# Patient Record
Sex: FEMALE | Race: OTHER | HISPANIC OR LATINO | URBAN - METROPOLITAN AREA
[De-identification: names, ages, dates, MRNs, and addresses within clinical notes are randomized per-mention and may not be internally consistent; named-entity substitution may affect disease eponyms.]

---

## 2017-06-24 ENCOUNTER — INPATIENT (INPATIENT)
Facility: HOSPITAL | Age: 62
LOS: 2 days | Discharge: HOME | DRG: 313 | End: 2017-06-27
Attending: INTERNAL MEDICINE | Admitting: INTERNAL MEDICINE
Payer: MEDICARE

## 2017-06-24 VITALS
SYSTOLIC BLOOD PRESSURE: 172 MMHG | RESPIRATION RATE: 18 BRPM | DIASTOLIC BLOOD PRESSURE: 84 MMHG | TEMPERATURE: 98 F | HEIGHT: 58 IN | HEART RATE: 72 BPM | OXYGEN SATURATION: 98 % | WEIGHT: 149.91 LBS

## 2017-06-24 DIAGNOSIS — E11.9 TYPE 2 DIABETES MELLITUS WITHOUT COMPLICATIONS: ICD-10-CM

## 2017-06-24 DIAGNOSIS — I24.9 ACUTE ISCHEMIC HEART DISEASE, UNSPECIFIED: ICD-10-CM

## 2017-06-24 DIAGNOSIS — I10 ESSENTIAL (PRIMARY) HYPERTENSION: ICD-10-CM

## 2017-06-24 DIAGNOSIS — Z29.9 ENCOUNTER FOR PROPHYLACTIC MEASURES, UNSPECIFIED: ICD-10-CM

## 2017-06-24 DIAGNOSIS — N76.0 ACUTE VAGINITIS: ICD-10-CM

## 2017-06-24 DIAGNOSIS — E86.0 DEHYDRATION: ICD-10-CM

## 2017-06-24 LAB
ACETONE SERPL-MCNC: ABNORMAL
ALBUMIN SERPL ELPH-MCNC: 3.5 G/DL — SIGNIFICANT CHANGE UP (ref 3.5–5)
ALP SERPL-CCNC: 80 U/L — SIGNIFICANT CHANGE UP (ref 40–120)
ALT FLD-CCNC: 44 U/L DA — SIGNIFICANT CHANGE UP (ref 10–60)
ANION GAP SERPL CALC-SCNC: 7 MMOL/L — SIGNIFICANT CHANGE UP (ref 5–17)
APPEARANCE UR: CLEAR — SIGNIFICANT CHANGE UP
AST SERPL-CCNC: 38 U/L — SIGNIFICANT CHANGE UP (ref 10–40)
BASOPHILS # BLD AUTO: 0.1 K/UL — SIGNIFICANT CHANGE UP (ref 0–0.2)
BASOPHILS NFR BLD AUTO: 1.1 % — SIGNIFICANT CHANGE UP (ref 0–2)
BILIRUB SERPL-MCNC: 0.5 MG/DL — SIGNIFICANT CHANGE UP (ref 0.2–1.2)
BILIRUB UR-MCNC: NEGATIVE — SIGNIFICANT CHANGE UP
BUN SERPL-MCNC: 12 MG/DL — SIGNIFICANT CHANGE UP (ref 7–18)
CALCIUM SERPL-MCNC: 9.1 MG/DL — SIGNIFICANT CHANGE UP (ref 8.4–10.5)
CHLORIDE SERPL-SCNC: 105 MMOL/L — SIGNIFICANT CHANGE UP (ref 96–108)
CK MB BLD-MCNC: <1.3 % — SIGNIFICANT CHANGE UP (ref 0–3.5)
CK MB BLD-MCNC: <2.2 % — SIGNIFICANT CHANGE UP (ref 0–3.5)
CK MB CFR SERPL CALC: <1 NG/ML — SIGNIFICANT CHANGE UP (ref 0–3.6)
CK MB CFR SERPL CALC: <1 NG/ML — SIGNIFICANT CHANGE UP (ref 0–3.6)
CK SERPL-CCNC: 45 U/L — SIGNIFICANT CHANGE UP (ref 21–215)
CK SERPL-CCNC: 75 U/L — SIGNIFICANT CHANGE UP (ref 21–215)
CO2 SERPL-SCNC: 25 MMOL/L — SIGNIFICANT CHANGE UP (ref 22–31)
COLOR SPEC: YELLOW — SIGNIFICANT CHANGE UP
CREAT SERPL-MCNC: 0.73 MG/DL — SIGNIFICANT CHANGE UP (ref 0.5–1.3)
DIFF PNL FLD: NEGATIVE — SIGNIFICANT CHANGE UP
EOSINOPHIL # BLD AUTO: 0 K/UL — SIGNIFICANT CHANGE UP (ref 0–0.5)
EOSINOPHIL NFR BLD AUTO: 0.2 % — SIGNIFICANT CHANGE UP (ref 0–6)
GLUCOSE SERPL-MCNC: 129 MG/DL — HIGH (ref 70–99)
GLUCOSE UR QL: NEGATIVE — SIGNIFICANT CHANGE UP
HCT VFR BLD CALC: 44.8 % — SIGNIFICANT CHANGE UP (ref 34.5–45)
HGB BLD-MCNC: 14.9 G/DL — SIGNIFICANT CHANGE UP (ref 11.5–15.5)
KETONES UR-MCNC: ABNORMAL
LEUKOCYTE ESTERASE UR-ACNC: NEGATIVE — SIGNIFICANT CHANGE UP
LYMPHOCYTES # BLD AUTO: 1.2 K/UL — SIGNIFICANT CHANGE UP (ref 1–3.3)
LYMPHOCYTES # BLD AUTO: 10.8 % — LOW (ref 13–44)
MAGNESIUM SERPL-MCNC: 1.8 MG/DL — SIGNIFICANT CHANGE UP (ref 1.6–2.6)
MCHC RBC-ENTMCNC: 31.2 PG — SIGNIFICANT CHANGE UP (ref 27–34)
MCHC RBC-ENTMCNC: 33.1 GM/DL — SIGNIFICANT CHANGE UP (ref 32–36)
MCV RBC AUTO: 94.1 FL — SIGNIFICANT CHANGE UP (ref 80–100)
MONOCYTES # BLD AUTO: 0.4 K/UL — SIGNIFICANT CHANGE UP (ref 0–0.9)
MONOCYTES NFR BLD AUTO: 3.9 % — SIGNIFICANT CHANGE UP (ref 2–14)
NEUTROPHILS # BLD AUTO: 9.1 K/UL — HIGH (ref 1.8–7.4)
NEUTROPHILS NFR BLD AUTO: 84 % — HIGH (ref 43–77)
NITRITE UR-MCNC: NEGATIVE — SIGNIFICANT CHANGE UP
NT-PROBNP SERPL-SCNC: 78 PG/ML — SIGNIFICANT CHANGE UP (ref 0–125)
PH UR: 7 — SIGNIFICANT CHANGE UP (ref 5–8)
PLATELET # BLD AUTO: 261 K/UL — SIGNIFICANT CHANGE UP (ref 150–400)
POTASSIUM SERPL-MCNC: 4.3 MMOL/L — SIGNIFICANT CHANGE UP (ref 3.5–5.3)
POTASSIUM SERPL-SCNC: 4.3 MMOL/L — SIGNIFICANT CHANGE UP (ref 3.5–5.3)
PROT SERPL-MCNC: 8 G/DL — SIGNIFICANT CHANGE UP (ref 6–8.3)
PROT UR-MCNC: NEGATIVE — SIGNIFICANT CHANGE UP
RBC # BLD: 4.77 M/UL — SIGNIFICANT CHANGE UP (ref 3.8–5.2)
RBC # FLD: 12.3 % — SIGNIFICANT CHANGE UP (ref 10.3–14.5)
SODIUM SERPL-SCNC: 137 MMOL/L — SIGNIFICANT CHANGE UP (ref 135–145)
SP GR SPEC: 1 — LOW (ref 1.01–1.02)
TROPONIN I SERPL-MCNC: <0.015 NG/ML — SIGNIFICANT CHANGE UP (ref 0–0.04)
TROPONIN I SERPL-MCNC: <0.015 NG/ML — SIGNIFICANT CHANGE UP (ref 0–0.04)
UROBILINOGEN FLD QL: NEGATIVE — SIGNIFICANT CHANGE UP
WBC # BLD: 10.8 K/UL — HIGH (ref 3.8–10.5)
WBC # FLD AUTO: 10.8 K/UL — HIGH (ref 3.8–10.5)

## 2017-06-24 PROCEDURE — 70450 CT HEAD/BRAIN W/O DYE: CPT | Mod: 26

## 2017-06-24 PROCEDURE — 99285 EMERGENCY DEPT VISIT HI MDM: CPT

## 2017-06-24 PROCEDURE — 71010: CPT | Mod: 26

## 2017-06-24 RX ORDER — SODIUM CHLORIDE 9 MG/ML
1000 INJECTION INTRAMUSCULAR; INTRAVENOUS; SUBCUTANEOUS
Qty: 0 | Refills: 0 | Status: DISCONTINUED | OUTPATIENT
Start: 2017-06-24 | End: 2017-06-26

## 2017-06-24 RX ORDER — METOPROLOL TARTRATE 50 MG
50 TABLET ORAL
Qty: 0 | Refills: 0 | Status: DISCONTINUED | OUTPATIENT
Start: 2017-06-24 | End: 2017-06-24

## 2017-06-24 RX ORDER — GLUCAGON INJECTION, SOLUTION 0.5 MG/.1ML
1 INJECTION, SOLUTION SUBCUTANEOUS ONCE
Qty: 0 | Refills: 0 | Status: DISCONTINUED | OUTPATIENT
Start: 2017-06-24 | End: 2017-06-27

## 2017-06-24 RX ORDER — FLUCONAZOLE 150 MG/1
100 TABLET ORAL DAILY
Qty: 0 | Refills: 0 | Status: DISCONTINUED | OUTPATIENT
Start: 2017-06-24 | End: 2017-06-27

## 2017-06-24 RX ORDER — INSULIN LISPRO 100/ML
VIAL (ML) SUBCUTANEOUS
Qty: 0 | Refills: 0 | Status: DISCONTINUED | OUTPATIENT
Start: 2017-06-24 | End: 2017-06-27

## 2017-06-24 RX ORDER — MECLIZINE HCL 12.5 MG
12.5 TABLET ORAL ONCE
Qty: 0 | Refills: 0 | Status: COMPLETED | OUTPATIENT
Start: 2017-06-24 | End: 2017-06-24

## 2017-06-24 RX ORDER — METOPROLOL TARTRATE 50 MG
50 TABLET ORAL
Qty: 0 | Refills: 0 | Status: DISCONTINUED | OUTPATIENT
Start: 2017-06-24 | End: 2017-06-26

## 2017-06-24 RX ORDER — ERGOCALCIFEROL 1.25 MG/1
50000 CAPSULE ORAL
Qty: 0 | Refills: 0 | Status: DISCONTINUED | OUTPATIENT
Start: 2017-06-25 | End: 2017-06-27

## 2017-06-24 RX ORDER — DEXTROSE 50 % IN WATER 50 %
25 SYRINGE (ML) INTRAVENOUS ONCE
Qty: 0 | Refills: 0 | Status: DISCONTINUED | OUTPATIENT
Start: 2017-06-24 | End: 2017-06-27

## 2017-06-24 RX ORDER — METOPROLOL TARTRATE 50 MG
12.5 TABLET ORAL
Qty: 0 | Refills: 0 | Status: DISCONTINUED | OUTPATIENT
Start: 2017-06-24 | End: 2017-06-24

## 2017-06-24 RX ORDER — ASPIRIN/CALCIUM CARB/MAGNESIUM 324 MG
162 TABLET ORAL ONCE
Qty: 0 | Refills: 0 | Status: COMPLETED | OUTPATIENT
Start: 2017-06-24 | End: 2017-06-24

## 2017-06-24 RX ORDER — DEXTROSE 50 % IN WATER 50 %
1 SYRINGE (ML) INTRAVENOUS ONCE
Qty: 0 | Refills: 0 | Status: DISCONTINUED | OUTPATIENT
Start: 2017-06-24 | End: 2017-06-27

## 2017-06-24 RX ORDER — PENICILLIN V POTASSIUM 250 MG
500 TABLET ORAL EVERY 6 HOURS
Qty: 0 | Refills: 0 | Status: DISCONTINUED | OUTPATIENT
Start: 2017-06-24 | End: 2017-06-27

## 2017-06-24 RX ORDER — ASPIRIN/CALCIUM CARB/MAGNESIUM 324 MG
81 TABLET ORAL DAILY
Qty: 0 | Refills: 0 | Status: DISCONTINUED | OUTPATIENT
Start: 2017-06-24 | End: 2017-06-27

## 2017-06-24 RX ORDER — ATORVASTATIN CALCIUM 80 MG/1
40 TABLET, FILM COATED ORAL AT BEDTIME
Qty: 0 | Refills: 0 | Status: DISCONTINUED | OUTPATIENT
Start: 2017-06-24 | End: 2017-06-27

## 2017-06-24 RX ORDER — METFORMIN HYDROCHLORIDE 850 MG/1
500 TABLET ORAL
Qty: 0 | Refills: 0 | Status: DISCONTINUED | OUTPATIENT
Start: 2017-06-24 | End: 2017-06-27

## 2017-06-24 RX ORDER — DEXTROSE 50 % IN WATER 50 %
12.5 SYRINGE (ML) INTRAVENOUS ONCE
Qty: 0 | Refills: 0 | Status: DISCONTINUED | OUTPATIENT
Start: 2017-06-24 | End: 2017-06-27

## 2017-06-24 RX ORDER — SODIUM CHLORIDE 9 MG/ML
1000 INJECTION, SOLUTION INTRAVENOUS
Qty: 0 | Refills: 0 | Status: DISCONTINUED | OUTPATIENT
Start: 2017-06-24 | End: 2017-06-27

## 2017-06-24 RX ADMIN — ATORVASTATIN CALCIUM 40 MILLIGRAM(S): 80 TABLET, FILM COATED ORAL at 20:06

## 2017-06-24 RX ADMIN — SODIUM CHLORIDE 75 MILLILITER(S): 9 INJECTION INTRAMUSCULAR; INTRAVENOUS; SUBCUTANEOUS at 21:05

## 2017-06-24 RX ADMIN — Medication 162 MILLIGRAM(S): at 19:13

## 2017-06-24 RX ADMIN — Medication 12.5 MILLIGRAM(S): at 15:43

## 2017-06-24 NOTE — ED PROVIDER NOTE - CHPI ED SYMPTOMS NEG
no chills/no diarrhea, no palpitations, no numbness, no tingling, no weakness, no visual changes/no fever

## 2017-06-24 NOTE — H&P ADULT - HISTORY OF PRESENT ILLNESS
61 y/o F pt with PMHx of HTN,  DM, HTN, presents to ED c/o intermittent substernal chest pain that radiates to right arm with 7/10 of intensity. Pt also has associated symptoms of dizziness, nausea, diaphoresis, fluctuating blood pressure, lightheadedness x 1 week.Pt also complaints of SOB, diaphoresis and urinary frequency. Pt denies fever, chills, diarrhea, palpitations, numbness, tingling, weakness, visual changes, or any other complaints. NKDA.

## 2017-06-24 NOTE — H&P ADULT - ASSESSMENT
61 y/o F pt with PMHx of HTN,  DM, HTN, presents to ED c/o intermittent substernal chest pain that radiates to right arm with 7/10 of intensity. Pt also has associated symptoms of dizziness, nausea, diaphoresis, fluctuating blood pressure, lightheadedness x 1 week

## 2017-06-24 NOTE — H&P ADULT - NSHPLABSRESULTS_GEN_ALL_CORE
Head CT:    In the head ventricular and cortical sulcal size is within normal limits.    No intracranial blood collections or subarachnoid hemorrhages are evident.    There is no sense of acute territorial infarct, mass, or edema.    IMPRESSION: Negative study.      CXR:    Clear lungs.

## 2017-06-24 NOTE — H&P ADULT - PROBLEM SELECTOR PLAN 1
63 y/o F pt with PMHx of HTN,  DM, HTN, presents to ED c/o intermittent substernal chest pain that radiates to right arm with 7/10 of intensity. associated symptoms of dizziness, nausea, diaphoresis, fluctuating blood pressure, lightheadedness. EKG normal sinus, with T wave inversions in lead 3 and 1-4  RADHA: 2 points  Due to positive risk factors of DM, HTN and presenting symptoms will admit for ACS protocol     Admit to tele  ASA, statin, BB  lipid profile, A1c  will get echo to obtain baseline  Cardio: 61 y/o F pt with PMHx of HTN,  DM, HTN, presents to ED c/o intermittent substernal chest pain that radiates to right arm with 7/10 of intensity. associated symptoms of dizziness, nausea, diaphoresis, fluctuating blood pressure, lightheadedness. EKG normal sinus, with T wave inversions in lead 3 and 1-4  RADHA: 2 points  Due to positive risk factors of DM, HTN and presenting symptoms will admit for ACS protocol     Admit to tele  ASA, statin, BB  lipid profile, A1c  will get echo to obtain baseline  Cardio: Dr. Wong

## 2017-06-24 NOTE — H&P ADULT - PROBLEM SELECTOR PLAN 5
VTE score=1  no need for DVT prophylaxis Pt is on actual  treatment for vaginitis  c/w Fluconazole    Pt is also taking penicillin due to a mouth surgery

## 2017-06-24 NOTE — ED PROVIDER NOTE - OBJECTIVE STATEMENT
63 y/o F pt with PMHx of DM (taking pills, did not take medication this morning), and HTN, presents to ED c/o fluctuating blood pressure, lightheadedness, dizziness x 1 week, intermittent mid sternum chest pain, lasting approximately 5 seconds, described as tightness that radiates to right arm x yesterday, vomiting and cough x today. Pt endorses SOB, diaphoresis and urinary frequency. Pt denies fever, chills, diarrhea, palpitations, numbness, tingling, weakness, visual changes, or any other complaints. NKDA.

## 2017-06-24 NOTE — ED ADULT NURSE NOTE - OBJECTIVE STATEMENT
presented axox3 ambulatory c/o chest discomfort and dizziness presented axox3 ambulatory c/o chest discomfort and dizziness denies SOB  seen by DR Barnhart EKG completed

## 2017-06-24 NOTE — ED PROVIDER NOTE - MEDICAL DECISION MAKING DETAILS
63 y/o F pt with dizziness, CP, with h/o DM, concern for ACS. Will get Orthostatic to r/o dehydration. Will admit pt.

## 2017-06-24 NOTE — H&P ADULT - PROBLEM SELECTOR PLAN 2
Pt presented with dizziness   Possible mild  dehydration as pt is having urinary frequency and has ketone in the blood. UA negative for infection, most likely due to DM, f/u A1c to assess compliance   will start IV hydration x12 hrs Pt presented with dizziness  Head CT negative for acute pathology   Possible mild  dehydration as pt is having urinary frequency and poor PO intake since pt  has ketone in the blood. UA negative for infection, Pt has history of incontinence   will start IV hydration x12 hrs

## 2017-06-24 NOTE — H&P ADULT - ATTENDING COMMENTS
late entry  patient seen and examined along with admitting tresident   above reviewed and agreed   care plan also discussed with er attending , patient and family

## 2017-06-24 NOTE — ED PROVIDER NOTE - CHPI ED SYMPTOMS POS
CHEST PAIN/fluctuating blood pressure, lightheadedness, vomiting, diaphoresis, urinary frequency, cough/SHORTNESS OF BREATH

## 2017-06-24 NOTE — H&P ADULT - NSHPPHYSICALEXAM_GEN_ALL_CORE
GEN: NAD  HEENT: no JVD, supple neck  CVS: RRR, no RMG  Lungs: CTAx2, no wheezing, no rhonchi, no rales  Abd: soft, non tender, + BS  Ext: no edema, no cyanosis, no rash

## 2017-06-25 LAB
ALBUMIN SERPL ELPH-MCNC: 3 G/DL — LOW (ref 3.5–5)
ALP SERPL-CCNC: 71 U/L — SIGNIFICANT CHANGE UP (ref 40–120)
ALT FLD-CCNC: 37 U/L DA — SIGNIFICANT CHANGE UP (ref 10–60)
ANION GAP SERPL CALC-SCNC: 7 MMOL/L — SIGNIFICANT CHANGE UP (ref 5–17)
AST SERPL-CCNC: 19 U/L — SIGNIFICANT CHANGE UP (ref 10–40)
BASOPHILS # BLD AUTO: 0.1 K/UL — SIGNIFICANT CHANGE UP (ref 0–0.2)
BASOPHILS NFR BLD AUTO: 0.9 % — SIGNIFICANT CHANGE UP (ref 0–2)
BILIRUB SERPL-MCNC: 0.4 MG/DL — SIGNIFICANT CHANGE UP (ref 0.2–1.2)
BUN SERPL-MCNC: 15 MG/DL — SIGNIFICANT CHANGE UP (ref 7–18)
CALCIUM SERPL-MCNC: 8.5 MG/DL — SIGNIFICANT CHANGE UP (ref 8.4–10.5)
CHLORIDE SERPL-SCNC: 109 MMOL/L — HIGH (ref 96–108)
CHOLEST SERPL-MCNC: 137 MG/DL — SIGNIFICANT CHANGE UP (ref 10–199)
CK SERPL-CCNC: 39 U/L — SIGNIFICANT CHANGE UP (ref 21–215)
CK SERPL-CCNC: 47 U/L — SIGNIFICANT CHANGE UP (ref 21–215)
CO2 SERPL-SCNC: 27 MMOL/L — SIGNIFICANT CHANGE UP (ref 22–31)
CREAT SERPL-MCNC: 0.68 MG/DL — SIGNIFICANT CHANGE UP (ref 0.5–1.3)
CULTURE RESULTS: SIGNIFICANT CHANGE UP
EOSINOPHIL # BLD AUTO: 0.1 K/UL — SIGNIFICANT CHANGE UP (ref 0–0.5)
EOSINOPHIL NFR BLD AUTO: 1.5 % — SIGNIFICANT CHANGE UP (ref 0–6)
GLUCOSE SERPL-MCNC: 113 MG/DL — HIGH (ref 70–99)
HBA1C BLD-MCNC: 6.1 % — HIGH (ref 4–5.6)
HCT VFR BLD CALC: 39.9 % — SIGNIFICANT CHANGE UP (ref 34.5–45)
HDLC SERPL-MCNC: 36 MG/DL — LOW (ref 40–125)
HGB BLD-MCNC: 13.7 G/DL — SIGNIFICANT CHANGE UP (ref 11.5–15.5)
LIPID PNL WITH DIRECT LDL SERPL: 75 MG/DL — SIGNIFICANT CHANGE UP
LYMPHOCYTES # BLD AUTO: 2.5 K/UL — SIGNIFICANT CHANGE UP (ref 1–3.3)
LYMPHOCYTES # BLD AUTO: 25.7 % — SIGNIFICANT CHANGE UP (ref 13–44)
MAGNESIUM SERPL-MCNC: 2 MG/DL — SIGNIFICANT CHANGE UP (ref 1.6–2.6)
MCHC RBC-ENTMCNC: 31.7 PG — SIGNIFICANT CHANGE UP (ref 27–34)
MCHC RBC-ENTMCNC: 34.3 GM/DL — SIGNIFICANT CHANGE UP (ref 32–36)
MCV RBC AUTO: 92.6 FL — SIGNIFICANT CHANGE UP (ref 80–100)
MONOCYTES # BLD AUTO: 0.8 K/UL — SIGNIFICANT CHANGE UP (ref 0–0.9)
MONOCYTES NFR BLD AUTO: 8.5 % — SIGNIFICANT CHANGE UP (ref 2–14)
NEUTROPHILS # BLD AUTO: 6.1 K/UL — SIGNIFICANT CHANGE UP (ref 1.8–7.4)
NEUTROPHILS NFR BLD AUTO: 63.4 % — SIGNIFICANT CHANGE UP (ref 43–77)
PHOSPHATE SERPL-MCNC: 3.5 MG/DL — SIGNIFICANT CHANGE UP (ref 2.5–4.5)
PLATELET # BLD AUTO: 265 K/UL — SIGNIFICANT CHANGE UP (ref 150–400)
POTASSIUM SERPL-MCNC: 3.6 MMOL/L — SIGNIFICANT CHANGE UP (ref 3.5–5.3)
POTASSIUM SERPL-SCNC: 3.6 MMOL/L — SIGNIFICANT CHANGE UP (ref 3.5–5.3)
PROT SERPL-MCNC: 7 G/DL — SIGNIFICANT CHANGE UP (ref 6–8.3)
RBC # BLD: 4.31 M/UL — SIGNIFICANT CHANGE UP (ref 3.8–5.2)
RBC # FLD: 12.5 % — SIGNIFICANT CHANGE UP (ref 10.3–14.5)
SODIUM SERPL-SCNC: 143 MMOL/L — SIGNIFICANT CHANGE UP (ref 135–145)
SPECIMEN SOURCE: SIGNIFICANT CHANGE UP
TOTAL CHOLESTEROL/HDL RATIO MEASUREMENT: 3.8 RATIO — SIGNIFICANT CHANGE UP (ref 3.3–7.1)
TRIGL SERPL-MCNC: 128 MG/DL — SIGNIFICANT CHANGE UP (ref 10–149)
TROPONIN I SERPL-MCNC: <0.015 NG/ML — SIGNIFICANT CHANGE UP (ref 0–0.04)
TROPONIN I SERPL-MCNC: <0.015 NG/ML — SIGNIFICANT CHANGE UP (ref 0–0.04)
TSH SERPL-MCNC: 0.97 UU/ML — SIGNIFICANT CHANGE UP (ref 0.34–4.82)
WBC # BLD: 9.6 K/UL — SIGNIFICANT CHANGE UP (ref 3.8–10.5)
WBC # FLD AUTO: 9.6 K/UL — SIGNIFICANT CHANGE UP (ref 3.8–10.5)

## 2017-06-25 RX ORDER — FLUTICASONE PROPIONATE 50 MCG
1 SPRAY, SUSPENSION NASAL
Qty: 0 | Refills: 0 | Status: DISCONTINUED | OUTPATIENT
Start: 2017-06-25 | End: 2017-06-27

## 2017-06-25 RX ADMIN — METFORMIN HYDROCHLORIDE 500 MILLIGRAM(S): 850 TABLET ORAL at 10:11

## 2017-06-25 RX ADMIN — Medication 10 MILLIGRAM(S): at 17:29

## 2017-06-25 RX ADMIN — ATORVASTATIN CALCIUM 40 MILLIGRAM(S): 80 TABLET, FILM COATED ORAL at 21:12

## 2017-06-25 RX ADMIN — Medication 81 MILLIGRAM(S): at 12:06

## 2017-06-25 RX ADMIN — Medication 10 MILLIGRAM(S): at 05:29

## 2017-06-25 RX ADMIN — Medication 50 MILLIGRAM(S): at 17:30

## 2017-06-25 RX ADMIN — Medication 1 SPRAY(S): at 18:55

## 2017-06-25 RX ADMIN — Medication 500 MILLIGRAM(S): at 05:29

## 2017-06-25 RX ADMIN — Medication 500 MILLIGRAM(S): at 17:29

## 2017-06-25 RX ADMIN — Medication 500 MILLIGRAM(S): at 12:07

## 2017-06-25 RX ADMIN — Medication 500 MILLIGRAM(S): at 23:41

## 2017-06-25 RX ADMIN — METFORMIN HYDROCHLORIDE 500 MILLIGRAM(S): 850 TABLET ORAL at 17:29

## 2017-06-25 RX ADMIN — ERGOCALCIFEROL 50000 UNIT(S): 1.25 CAPSULE ORAL at 17:29

## 2017-06-25 RX ADMIN — FLUCONAZOLE 100 MILLIGRAM(S): 150 TABLET ORAL at 12:07

## 2017-06-25 RX ADMIN — Medication 500 MILLIGRAM(S): at 00:09

## 2017-06-25 NOTE — PROGRESS NOTE ADULT - SUBJECTIVE AND OBJECTIVE BOX
EVETTE ARIAS  MRN-[id number]    Patient is a 62y old  Female who presents with a chief complaint of Chest pain (2017 19:18)      PATIENT SEEN AND EXAMINED   S NO FURTHER CHEST PAIN    MEDICATIONS  (STANDING):  aspirin  chewable 81milliGRAM(s) Oral daily  atorvastatin 40milliGRAM(s) Oral at bedtime  insulin lispro (HumaLOG) corrective regimen sliding scale  SubCutaneous Before meals and at bedtime  dextrose 5%. 1000milliLiter(s) IV Continuous <Continuous>  dextrose 50% Injectable 12.5Gram(s) IV Push once  dextrose 50% Injectable 25Gram(s) IV Push once  dextrose 50% Injectable 25Gram(s) IV Push once  sodium chloride 0.9%. 1000milliLiter(s) IV Continuous <Continuous>  metFORMIN 500milliGRAM(s) Oral two times a day with meals  enalapril 10milliGRAM(s) Oral two times a day  ergocalciferol [id number](s) Oral <User Schedule>  penicillin  VK 500milliGRAM(s) Oral every 6 hours  fluconAZOLE   Tablet 100milliGRAM(s) Oral daily  metoprolol 50milliGRAM(s) Oral two times a day      MEDICATIONS  (PRN):  dextrose Gel 1Dose(s) Oral once PRN Blood Glucose LESS THAN 70 milliGRAM(s)/deciLiter  glucagon  Injectable 1milliGRAM(s) IntraMuscular once PRN Glucose <70 milliGRAM(s)/deciLiter      Allergies    No Known Allergies    Intolerances        PAST MEDICAL & SURGICAL HISTORY:  DM (diabetes mellitus)  HTN (hypertension)  No significant past surgical history      FAMILY HISTORY:  No pertinent family history in first degree relatives      SOCIAL HISTORY  Smoking History:     REVIEW OF SYSTEMS:    CONSTITUTIONAL:  Fevers [  ]  Yes  [X ]  No                                   chills [  ]  Yes  [XX  ]  No                               sweats  [  ]  Yes  [ X ]  No                                fatigue [X  ]  Yes  [  ]  No    HEENT:  Eyes:  Diplopia or blurred vision [  ]  Yes  [X  ]  No    ENT:                        earache  [  ]  Yes  [ X ]  No                             sore throat  [  ]  Yes  [ X ]  No                            runny nose.  [  ]  Yes  [X  ]  No    CARDIOVASCULAR:       chest pain  [  ]  Yes  [ X ]  No                        squeezing, tightness,  [  ]  Yes  [ X ]  No                                      palpitations.  [  ]  Yes  [ X ]  No    RESPIRATORY:             Cough [  ]  Yes  [X  ]  No                                  Wheezing [  ]  Yes  [X  ]  No                                Hemoptysis [  ]  Yes  [ X ]  No                                      Sputum [  ]  Yes  [ X ]  No                   Shortness of Breathe [  ]  Yes  [X  ]  No    GASTROINTESTINAL:      Abdominal pain  [  ]  Yes  [ X ]  No                                                    Nausea  [  ]  Yes  [ XX ]  No                                                   Vomiting [  ]  Yes  [X  ]  No                                              Constipation [  ]  Yes  [X  ]  No                                                    Diarrhea [  ]  Yes  [X  ]  No    GENITOURINARY:           Incontinence [  ]  Yes  [ X ]  No                                                Dysuria [  ]  Yes  [ X ]  No                                      Blood in Urine  [  ]  Yes  [ X ]  No                          Frequency or urgency.  [  ]  Yes  [X  ]  No  X  NEUROLOGIC:                     Paresthesias  [  ]  Yes  [X  ]  No                                             fasciculations  [  ]  Yes  [ X ]  No                                seizures or weakness.  [  ]  Yes  [ X ]  No                                                   Headache [  ]  Yes  [ X ]  No                                  Loss of Conciousness [  ]  Yes  [ X ]  No                                                     Insomnia [  ]  Yes  [X  ]  No    PSYCHIATRIC:    Disorder of thought or mood.  [  ]  Yes  [X  ]  No                                                           Anxiety [  ]  Yes  [ X ]  No                                                      Depression [  ]  Yes  [X  ]  No                                                         Dementia [  ]  Yes  [ X ]  No    Vital Signs Last 24 Hrs  T(C): 37.1, Max: 37.1 (06-25 @ 10:30)  T(F): 98.7, Max: 98.8 (06-25 @ 10:30)  HR: 73 (57 - 78)  BP: 133/74 (121/71 - 153/85)  BP(mean): --  RR: 17 (16 - 20)  SpO2: 95% (93% - 99%)  I&O's Detail      PHYSICAL EXAMINATION:    GENERAL: The patient is a well-developed, well-nourished MALE_____in no apparent distress.     HEENT: Head is normocephalic and atraumatic. Extraocular muscles are intact. Mucous membranes are moist.     NECK: Supple. jvd [  ]  Yes  [ X ]  No    LUNGS: Clear to auscultation  [X  ]  Yes  [  ]  No                               wheezing, [  ]  Yes  [ X]  No                                      rales  [  ]  Yes  [X  ]  No                                    rhonchi  [  ]  Yes  [ X ]  No                 Respirations labored  [  ]  Yes  [ X ]  No    HEART: Regular rate and rhythm  [ X ]  Yes  [  ]  No                                        Murmur [  ]  Yes  [ X ]  No                                              rub  [  ]  Yes  [ X ]  No                                          gallop  [  ]  Yes  [ X ]  No    ABDOMEN:                                 Soft, [X  ]  Yes  [  ]  No                                             nontender [X  ]  Yes  [  ]  No                                             distended.[  ]  Yes  [  X]  No                            hepatosplenomegaly ,[  ]  Yes  [X  ]  No                                                    BS   [ X ]  Yes  [  ]  No    EXTREMITIES:                cyanosis, [  ]  Yes  [ X ]  No                                       clubbing,   [  ]  Yes  [ X ]  No                                               rash, [  ]  Yes  [X  ]  No                                           edema.  [  ]  Yes  [X  ]  No    NEUROLOGIC: Alert and Oriented  [ X ] Person [ X ] Time  [ X] Place                                    Cranial nerves intact    [ X ]  Yes  [  ]  No                                               sensory Intact  [ X ]  Yes  [  ]  No                                                  motor WNL   [ X ]  Yes  [  ]  No                                                Chris Paresis  [  ]  Yes  [ X ]  No  DTR  babinski+ or -      LABS:                        13.7   9.6   )-----------( 265      ( 2017 05:50 )             39.9     06-25    143  |  109<H>  |  15  ----------------------------<  113<H>  3.6   |  27  |  0.68    Ca    8.5      2017 05:50  Phos  3.5       Mg     2.0         TPro  7.0  /  Alb  3.0<L>  /  TBili  0.4  /  DBili  x   /  AST  19  /  ALT  37  /  AlkPhos  71  -25      Urinalysis Basic - ( 2017 17:10 )    Color: Yellow / Appearance: Clear / S.005 / pH: x  Gluc: x / Ketone: Trace  / Bili: Negative / Urobili: Negative   Blood: x / Protein: Negative / Nitrite: Negative   Leuk Esterase: Negative / RBC: x / WBC x   Sq Epi: x / Non Sq Epi: x / Bacteria: x        CARDIAC MARKERS ( 2017 13:31 )  <0.015 ng/mL / x     / 47 U/L / x     / x      CARDIAC MARKERS ( 2017 05:50 )  <0.015 ng/mL / x     / 39 U/L / x     / x      CARDIAC MARKERS ( 2017 21:39 )  <0.015 ng/mL / x     / 45 U/L / x     / <1.0 ng/mL  CARDIAC MARKERS ( 2017 14:51 )  <0.015 ng/mL / x     / 75 U/L / x     / <1.0 ng/mL        Serum Pro-Brain Natriuretic Peptide: 78 pg/mL ( @ 18:59)          MICROBIOLOGY:    RADIOLOGY & ADDITIONAL STUDIES:    CXR:    Ct scan chest:    ekg;    echo:

## 2017-06-26 DIAGNOSIS — R42 DIZZINESS AND GIDDINESS: ICD-10-CM

## 2017-06-26 DIAGNOSIS — E11.9 TYPE 2 DIABETES MELLITUS WITHOUT COMPLICATIONS: ICD-10-CM

## 2017-06-26 LAB — 24R-OH-CALCIDIOL SERPL-MCNC: 34.5 NG/ML — SIGNIFICANT CHANGE UP (ref 30–100)

## 2017-06-26 PROCEDURE — 99223 1ST HOSP IP/OBS HIGH 75: CPT

## 2017-06-26 RX ORDER — MECLIZINE HCL 12.5 MG
12.5 TABLET ORAL THREE TIMES A DAY
Qty: 0 | Refills: 0 | Status: DISCONTINUED | OUTPATIENT
Start: 2017-06-26 | End: 2017-06-26

## 2017-06-26 RX ORDER — ERGOCALCIFEROL 1.25 MG/1
1 CAPSULE ORAL
Qty: 4 | Refills: 0 | OUTPATIENT
Start: 2017-06-26 | End: 2017-07-26

## 2017-06-26 RX ORDER — ATORVASTATIN CALCIUM 80 MG/1
1 TABLET, FILM COATED ORAL
Qty: 30 | Refills: 0 | OUTPATIENT
Start: 2017-06-26 | End: 2017-07-26

## 2017-06-26 RX ORDER — METOPROLOL TARTRATE 50 MG
25 TABLET ORAL
Qty: 0 | Refills: 0 | Status: DISCONTINUED | OUTPATIENT
Start: 2017-06-26 | End: 2017-06-27

## 2017-06-26 RX ORDER — SODIUM CHLORIDE 9 MG/ML
1000 INJECTION INTRAMUSCULAR; INTRAVENOUS; SUBCUTANEOUS
Qty: 0 | Refills: 0 | Status: DISCONTINUED | OUTPATIENT
Start: 2017-06-26 | End: 2017-06-27

## 2017-06-26 RX ORDER — METFORMIN HYDROCHLORIDE 850 MG/1
1 TABLET ORAL
Qty: 60 | Refills: 0 | OUTPATIENT
Start: 2017-06-26 | End: 2017-07-26

## 2017-06-26 RX ORDER — ASPIRIN/CALCIUM CARB/MAGNESIUM 324 MG
1 TABLET ORAL
Qty: 30 | Refills: 0 | OUTPATIENT
Start: 2017-06-26 | End: 2017-07-26

## 2017-06-26 RX ORDER — MECLIZINE HCL 12.5 MG
12.5 TABLET ORAL ONCE
Qty: 0 | Refills: 0 | Status: COMPLETED | OUTPATIENT
Start: 2017-06-26 | End: 2017-06-26

## 2017-06-26 RX ADMIN — Medication 50 MILLIGRAM(S): at 05:24

## 2017-06-26 RX ADMIN — METFORMIN HYDROCHLORIDE 500 MILLIGRAM(S): 850 TABLET ORAL at 08:42

## 2017-06-26 RX ADMIN — Medication 500 MILLIGRAM(S): at 18:49

## 2017-06-26 RX ADMIN — FLUCONAZOLE 100 MILLIGRAM(S): 150 TABLET ORAL at 11:43

## 2017-06-26 RX ADMIN — Medication 1 SPRAY(S): at 18:48

## 2017-06-26 RX ADMIN — Medication 12.5 MILLIGRAM(S): at 12:03

## 2017-06-26 RX ADMIN — Medication 10 MILLIGRAM(S): at 05:24

## 2017-06-26 RX ADMIN — METFORMIN HYDROCHLORIDE 500 MILLIGRAM(S): 850 TABLET ORAL at 18:49

## 2017-06-26 RX ADMIN — ATORVASTATIN CALCIUM 40 MILLIGRAM(S): 80 TABLET, FILM COATED ORAL at 21:14

## 2017-06-26 RX ADMIN — Medication 1 SPRAY(S): at 05:24

## 2017-06-26 RX ADMIN — Medication 81 MILLIGRAM(S): at 11:43

## 2017-06-26 RX ADMIN — Medication 25 MILLIGRAM(S): at 18:50

## 2017-06-26 RX ADMIN — Medication 500 MILLIGRAM(S): at 05:24

## 2017-06-26 RX ADMIN — Medication 500 MILLIGRAM(S): at 11:43

## 2017-06-26 NOTE — PROGRESS NOTE ADULT - SUBJECTIVE AND OBJECTIVE BOX
Patient is a 62y old  Female who presents with a chief complaint of Chest pain (2017 19:18)      INTERVAL HPI/OVERNIGHT EVENTS: Still complain of dizziness and headache       REVIEW OF SYSTEMS:    CONSTITUTIONAL: No fever,   EYES: no acute visual disturbances  NECK: No pain or stiffness  RESPIRATORY: No cough; No shortness of breath  CARDIOVASCULAR: No chest pain, no palpitations  GASTROINTESTINAL: No pain. No nausea or vomiting; No diarrhea   NEUROLOGICAL: No headache or numbness, no tremors  HEME/LYMPH: No  bleeding gums    Vital Signs Last 24 Hrs  T(C): 36.6, Max: 37.1 ( @ 16:10)  T(F): 97.8, Max: 98.7 ( @ 16:10)  HR: 70 (65 - 73)  BP: 117/62 (109/61 - 133/74)  BP(mean): --  RR: 17 (17 - 18)  SpO2: 95% (95% - 96%)    ________________________________________________  PHYSICAL EXAM:  GENERAL: NAD,   HEAD:  , Normocephalic  EYES:  conjunctiva and sclera clear  NECK: Supple, No JVD    NERVOUS SYSTEM:  Alert & Oriented X3,   CHEST/LUNG: Clear to auscuitation bilaterally;   HEART: S1 S2+;   ABDOMEN: Soft, Nontender, Nondistended; Bowel sounds present  EXTREMITIES: no cyanosis; no edema; no calf tenderness    _________________________________________________  LABS:                        13.7   9.6   )-----------( 265      ( 2017 05:50 )             39.9     06-25    143  |  109<H>  |  15  ----------------------------<  113<H>  3.6   |  27  |  0.68    Ca    8.5      2017 05:50  Phos  3.5     06-25  Mg     2.0     -25    TPro  7.0  /  Alb  3.0<L>  /  TBili  0.4  /  DBili  x   /  AST  19  /  ALT  37  /  AlkPhos  71  06-25      Urinalysis Basic - ( 2017 17:10 )    Color: Yellow / Appearance: Clear / S.005 / pH: x  Gluc: x / Ketone: Trace  / Bili: Negative / Urobili: Negative   Blood: x / Protein: Negative / Nitrite: Negative   Leuk Esterase: Negative / RBC: x / WBC x   Sq Epi: x / Non Sq Epi: x / Bacteria: x      CAPILLARY BLOOD GLUCOSE  155 (2017 11:03)  120 (2017 08:03)  133 (2017 21:09)  87 (2017 16:10)

## 2017-06-26 NOTE — CONSULT NOTE ADULT - PROBLEM SELECTOR RECOMMENDATION 9
Chest pain-no evidence of recent cardiac injury-normal LV systolic function,chest pain atypical for angina.Cardiac status stable to discharge.

## 2017-06-26 NOTE — DISCHARGE NOTE ADULT - PATIENT PORTAL LINK FT
“You can access the [employer] Patient Portal, offered by [facility], by registering with the following website: [email or web address]”

## 2017-06-26 NOTE — PROGRESS NOTE ADULT - PROBLEM SELECTOR PLAN 2
-Pt presented with dizziness ( no vertigo symptoms)   -Head CT negative for acute pathology   -Still reports mild dizziness especially when standing  - Orthostatics negative , but BP overall < 120/80   - Will give the IVF x 12 hours   - Neuro  consult Dr Leal

## 2017-06-26 NOTE — CONSULT NOTE ADULT - PROBLEM SELECTOR PROBLEM 2
HTN (hypertension)
Type 2 diabetes mellitus without complication, without long-term current use of insulin

## 2017-06-26 NOTE — DISCHARGE NOTE ADULT - CARE PLAN
Principal Discharge DX:	Dizziness  Secondary Diagnosis:	ACS (acute coronary syndrome)  Secondary Diagnosis:	DM (diabetes mellitus)  Secondary Diagnosis:	HTN (hypertension)  Secondary Diagnosis:	Vaginitis Principal Discharge DX:	Dizziness  Goal:	prevent from falls  Instructions for follow-up, activity and diet:	You admitted to telemetry for dizziness, your condition improves with IVF, your BP med also adjusted, MRI negative for stroke. Your condition improves. Need to take the enalpril 5 mg daily, lopressor 25 ER a day. Outpatient f/up with PCP recommended  Secondary Diagnosis:	ACS (acute coronary syndrome)  Goal:	Prevent the chest pain  Instructions for follow-up, activity and diet:	You admitted for acs workup, no evidence of ischemia , continue with lopressor 25 ER a day  and enalpril 5 mg daily,  Secondary Diagnosis:	DM (diabetes mellitus)  Goal:	Keep the fingersticks under control  Instructions for follow-up, activity and diet:	Your HBA 1 c is good, home medication of metformin continued  Secondary Diagnosis:	HTN (hypertension)  Goal:	keep the BP around 130/80  Instructions for follow-up, activity and diet:	Since you have dizziness, home medications adjusted , lopressor reduced to 25 ER a  day ( usual dose is 50 mg ER) , also enlapril reduced to 5 mg ( usual 10 mg ) .  Secondary Diagnosis:	Vaginitis  Instructions for follow-up, activity and diet:	Your home medication continued , already got the treatment for 5 days.

## 2017-06-26 NOTE — CONSULT NOTE ADULT - SUBJECTIVE AND OBJECTIVE BOX
Patient is a 62y old  Female who presents with a chief complaint of Chest pain    HPI:  61 y/o F pt with PMHx of HTN,  DM, HTN, presents to ED c/o intermittent substernal chest pain that radiates to right arm with 7/10 of intensity. Pt also has associated symptoms of dizziness, nausea, diaphoresis, fluctuating blood pressure, lightheadedness x 1 week.Pt also complaints of SOB, diaphoresis and urinary frequency. Pt denies fever, chills, diarrhea, palpitations, numbness, tingling, weakness, visual changes, or any other complaints. NKDA.    PAST MEDICAL & SURGICAL HISTORY:  DM (diabetes mellitus)  HTN (hypertension)  No significant past surgical history      PREVIOUS DIAGNOSTIC TESTING:      ECHO  FINDINGS:Study Date: 2017--Normal Left Ventricular Systolic Function,  (EF = 55 to 60%)Grade III diastolic dysfunction      MEDICATIONS  (STANDING):  aspirin  chewable 81milliGRAM(s) Oral daily  atorvastatin 40milliGRAM(s) Oral at bedtime  insulin lispro (HumaLOG) corrective regimen sliding scale  SubCutaneous Before meals and at bedtime  sodium chloride 0.9%. 1000milliLiter(s) IV Continuous <Continuous>  metFORMIN 500milliGRAM(s) Oral two times a day with meals  enalapril 10milliGRAM(s) Oral two times a day  ergocalciferol [id number](s) Oral <User Schedule>  penicillin  VK 500milliGRAM(s) Oral every 6 hours  fluconAZOLE   Tablet 100milliGRAM(s) Oral daily  metoprolol 50milliGRAM(s) Oral two times a day  fluticasone propionate 50 MICROgram(s)/spray Nasal Spray 1Spray(s) Both Nostrils two times a day      FAMILY HISTORY:  No pertinent family history in first degree relatives      SOCIAL HISTORY:    CIGARETTES:Non Smoker    ALCOHOL:Denies    REVIEW OF SYSTEMS:  · Negative General Symptoms	no fever; no chills	  · General Symptoms	sweating	  · Skin/Breast	negative	  · Negative Ophthalmologic Symptoms	no diplopia; no photophobia	  · Negative ENMT Symptoms	no hearing difficulty; no ear pain	  · Negative Respiratory and Thorax Symptoms	no wheezing; no dyspnea	  · Negative Cardiovascular Symptoms	no palpitations	  · Cardiovascular Symptoms	chest pain	  · Gastrointestinal Symptoms	nausea	  · Negative General Genitourinary Symptoms	no hematuria	  · General Genitourinary Symptoms	increased urinary frequency	  · Negative Musculoskeletal Symptoms	no arthralgia; no arthritis	  · Negative Neurological Symptoms	no transient paralysis; no loss of consciousness	  · Negative Psychiatric Symptoms	no suicidal ideation; no depression	  · Hematology/Lymphatics	negative	  · Endocrine	negative	  · Allergic/Immunologic	negative	    Vital Signs Last 24 Hrs  T(C): 36.8, Max: 37.1 ( @ 10:30)  T(F): 98.3, Max: 98.8 ( @ 10:30)  HR: 65 (65 - 78)  BP: 109/61 (109/61 - 140/76)  BP(mean): --  RR: 17 (16 - 18)  SpO2: 95% (95% - 96%)      PHYSICAL EXAM:  CONSTITUTIONAL:BMI-31  .  EYES:Anicteric [x ]EOMI.  ENMT:No oral lesions.  NECK:[ ]Bruits [ ]Thyroid [ ]JVD.  RESPIRATORY:Equal air entry bilaterally,[x ]Clear to auscultation[ ]Rales[ ]Rhonchi[ ]Wheeze.  CARDIOVASCULAR:Regular rhythm[ ]Rub[x ]Murmur- 2/6 systolic             [x ]Normal PMI.  GASTROINTESTINAL:[x ]BS[ ]Tenderness[ ]Reboumd[ ]Ridgitity[ ]Organomegaly.  EXTREMITIES:[ ]Clubbing[ ]Cyanosis[ ]Edema.  VASCULAR:[x ]Pulses intact and symmetrical.  NEUROLOGICAL:Alert &OrientetedX 3[x ]Normal strength[x ]NoFocal deficit.  SKIN:[ ]Lesions[ ]Rash.  MUSCULOSKEKETAL:[ ]Calf tenderness[ ]Joint abnormalities.     INTERPRETATION OF TELEMETRY:Sinus Rhythm no arrhythmias        LABS:                        13.7   9.6   )-----------( 265      ( 2017 05:50 )             39.9     -    143  |  109<H>  |  15  ----------------------------<  113<H>  3.6   |  27  |  0.68    Ca    8.5      2017 05:50  Phos  3.5     06-  Mg     2.0         TPro  7.0  /  Alb  3.0<L>  /  TBili  0.4  /  DBili  x   /  AST  19  /  ALT  37  /  AlkPhos  71      CARDIAC MARKERS ( 2017 13:31 )  <0.015 ng/mL / x     / 47 U/L / x     / x      CARDIAC MARKERS ( 2017 05:50 )  <0.015 ng/mL / x     / 39 U/L / x     / x      CARDIAC MARKERS ( 2017 21:39 )  <0.015 ng/mL / x     / 45 U/L / x     / <1.0 ng/mL  CARDIAC MARKERS ( 2017 14:51 )  <0.015 ng/mL / x     / 75 U/L / x     / <1.0 ng/mL        Urinalysis Basic - ( 2017 17:10 )    Color: Yellow / Appearance: Clear / S.005 / pH: x  Gluc: x / Ketone: Trace  / Bili: Negative / Urobili: Negative   Blood: x / Protein: Negative / Nitrite: Negative   Leuk Esterase: Negative / RBC: x / WBC x   Sq Epi: x / Non Sq Epi: x / Bacteria: x      RADIOLOGY & ADDITIONAL STUDIES: CT BRAIN --Negative study.

## 2017-06-26 NOTE — PROGRESS NOTE ADULT - PROBLEM SELECTOR PLAN 4
- Goal BP is 130/80  - BP usually less than < 120/80 , as patient complain of dizziness will decrease the dose of lopressor to 25 mg BID and enalapril to 5 mg   - Monitor the BP

## 2017-06-26 NOTE — DISCHARGE NOTE ADULT - PLAN OF CARE
prevent from falls You admitted to telemetry for dizziness, your condition improves with IVF, your BP med also adjusted, MRI negative for stroke. Your condition improves. Need to take the enalpril 5 mg daily, lopressor 25 ER a day. Outpatient f/up with PCP recommended Prevent the chest pain You admitted for acs workup, no evidence of ischemia , continue with lopressor 25 ER a day  and enalpril 5 mg daily, Keep the fingersticks under control Your HBA 1 c is good, home medication of metformin continued keep the BP around 130/80 Since you have dizziness, home medications adjusted , lopressor reduced to 25 ER a  day ( usual dose is 50 mg ER) , also enlapril reduced to 5 mg ( usual 10 mg ) . Your home medication continued , already got the treatment for 5 days.

## 2017-06-26 NOTE — PROGRESS NOTE ADULT - PROBLEM SELECTOR PLAN 3
-Hold  home Jentadueto   -Continue with metformin    -c/w HSS  - A1c is 6.1  - Blood sugars well controlled

## 2017-06-26 NOTE — CONSULT NOTE ADULT - SUBJECTIVE AND OBJECTIVE BOX
Patient is a 62y old  Female who presents with a chief complaint of Chest pain (24 Jun 2017 19:18)      HPI:  63 y/o F pt with PMHx of HTN,  DM, HTN, presents to ED c/o intermittent substernal chest pain that radiates to right arm with 7/10 of intensity. Pt also has associated symptoms of dizziness, nausea, diaphoresis, fluctuating blood pressure, lightheadedness x 1 week.Pt also complaints of SOB, diaphoresis and urinary frequency. Pt denies fever, chills, diarrhea, palpitations, numbness, tingling, weakness, visual changes, or any other complaints. NKDA. (24 Jun 2017 19:18)         Neurological Review of Systems:  No difficulty with language.  No vision loss or double vision.  No dizziness, vertigo or new hearing loss.  No difficulty with speech or swallowing.  No focal weakness.  No focal sensory changes.  No numbness or tingling in the bilateral lower extremities.  No difficulty with balance.  No difficulty with ambulation.        MEDICATIONS  (STANDING):  aspirin  chewable 81milliGRAM(s) Oral daily  atorvastatin 40milliGRAM(s) Oral at bedtime  insulin lispro (HumaLOG) corrective regimen sliding scale  SubCutaneous Before meals and at bedtime  dextrose 5%. 1000milliLiter(s) IV Continuous <Continuous>  dextrose 50% Injectable 12.5Gram(s) IV Push once  dextrose 50% Injectable 25Gram(s) IV Push once  dextrose 50% Injectable 25Gram(s) IV Push once  metFORMIN 500milliGRAM(s) Oral two times a day with meals  ergocalciferol 58583Mrcs(s) Oral <User Schedule>  penicillin  VK 500milliGRAM(s) Oral every 6 hours  fluconAZOLE   Tablet 100milliGRAM(s) Oral daily  fluticasone propionate 50 MICROgram(s)/spray Nasal Spray 1Spray(s) Both Nostrils two times a day  metoprolol 25milliGRAM(s) Oral two times a day  enalapril 5milliGRAM(s) Oral daily  sodium chloride 0.9%. 1000milliLiter(s) IV Continuous <Continuous>    MEDICATIONS  (PRN):  dextrose Gel 1Dose(s) Oral once PRN Blood Glucose LESS THAN 70 milliGRAM(s)/deciLiter  glucagon  Injectable 1milliGRAM(s) IntraMuscular once PRN Glucose <70 milliGRAM(s)/deciLiter    Allergies    No Known Allergies    Intolerances      PAST MEDICAL & SURGICAL HISTORY:  DM (diabetes mellitus)  HTN (hypertension)  No significant past surgical history    FAMILY HISTORY:  No pertinent family history in first degree relatives    SOCIAL HISTORY: non smoker/ former smoker/ active smoker    Review of Systems:  Constitutional: No generalized weakness. No fevers or chills.                    Eyes, Ears, Mouth, Throat: No vision loss   Respiratory: No shortness of breath or cough.                                Cardiovascular: No chest pain or palpitations  Gastrointestinal: No nausea or vomiting.                                         Genitourinary: No urinary incontinence or burning on urination.  Musculoskeletal: No joint pain.                                                           Dermatologic: No rash.  Neurological: as per HPI                                                                      Psychiatric: No behavioral problems.  Endocrine: No known hypoglycemia.               Hematologic/Lymphatic: No easy bleeding.    O:  Vital Signs Last 24 Hrs  T(C): 36.7, Max: 37 (06-26 @ 04:45)  T(F): 98, Max: 98.6 (06-26 @ 04:45)  HR: 92 (65 - 92)  BP: 136/80 (109/61 - 136/80)  BP(mean): --  RR: 17 (17 - 18)  SpO2: 96% (95% - 96%)    General Exam:   General appearance: No acute distress                 Cardiovascular: Pedal dorsalis pulses intact bilaterally    Mental Status: Orientated to self, date and place.  Attention intact.  No dysarthria, aphasia or neglect.  Knowledge intact.  Registration intact.  Short and long term memory grossly intact.      Cranial Nerves: CN I - not tested.  PERRL, EOMI, VFF, no nystagmus or diplopia.  No APD.  Fundi not visualized.  CN V1-3 intact to light touch and pinprick.  No facial asymmetry.  Hearing intact to finger rub bilaterally.  Tongue, uvula and palate midline.  Sternocleidomastoid and Trapezius intact bilaterally.    Motor:   Tone: normal.                  Strength intact throughout  No pronator drift bilaterally                      No dysmetria on finger-nose-finger or heel-shin-heel  No truncal ataxia.  No resting, postural or action tremor.  No myoclonus.    Sensation: intact to light touch, pinprick, vibration and proprioception    Deep Tendon Reflexes: 1+ bilateral biceps, triceps, brachioradialis, knee and ankle  Toes flexor bilaterally    Gait: normal and stable.  Rhomberg -zari.    Other:     LABS:                        13.7   9.6   )-----------( 265      ( 25 Jun 2017 05:50 )             39.9     06-25    143  |  109<H>  |  15  ----------------------------<  113<H>  3.6   |  27  |  0.68    Ca    8.5      25 Jun 2017 05:50  Phos  3.5     06-25  Mg     2.0     06-25    TPro  7.0  /  Alb  3.0<L>  /  TBili  0.4  /  DBili  x   /  AST  19  /  ALT  37  /  AlkPhos  71  06-25            RADIOLOGY & ADDITIONAL STUDIES:    EKG:  Diagnosis Line Normal sinus rhythm  T wave abnormality, consider anterior ischemia  Abnormal ECG      CTH (images reviwed)IMPRESSION: Negative study. HPI:  63 y/o F pt with PMHx of HTN,  DM, HTN, presents to ED c/o intermittent substernal chest pain that radiates to right arm with 7/10 of intensity. Pt also has associated symptoms of dizziness, nausea, diaphoresis, fluctuating blood pressure, lightheadedness x 1 week. The patient also c/o weakness in the right arm.       Dizziness is worse with change of position, improved with IVF.    No difficulty with language.  No vision loss or double vision.  No  vertigo or new hearing loss.  No difficulty with speech or swallowing.   No numbness or tingling in the bilateral lower extremities.  No difficulty with balance.  No difficulty with ambulation.        MEDICATIONS  (STANDING):  aspirin  chewable 81milliGRAM(s) Oral daily  atorvastatin 40milliGRAM(s) Oral at bedtime  insulin lispro (HumaLOG) corrective regimen sliding scale  SubCutaneous Before meals and at bedtime  dextrose 5%. 1000milliLiter(s) IV Continuous <Continuous>  dextrose 50% Injectable 12.5Gram(s) IV Push once  dextrose 50% Injectable 25Gram(s) IV Push once  dextrose 50% Injectable 25Gram(s) IV Push once  metFORMIN 500milliGRAM(s) Oral two times a day with meals  ergocalciferol 47978Efvu(s) Oral <User Schedule>  penicillin  VK 500milliGRAM(s) Oral every 6 hours  fluconAZOLE   Tablet 100milliGRAM(s) Oral daily  fluticasone propionate 50 MICROgram(s)/spray Nasal Spray 1Spray(s) Both Nostrils two times a day  metoprolol 25milliGRAM(s) Oral two times a day  enalapril 5milliGRAM(s) Oral daily  sodium chloride 0.9%. 1000milliLiter(s) IV Continuous <Continuous>    MEDICATIONS  (PRN):  dextrose Gel 1Dose(s) Oral once PRN Blood Glucose LESS THAN 70 milliGRAM(s)/deciLiter  glucagon  Injectable 1milliGRAM(s) IntraMuscular once PRN Glucose <70 milliGRAM(s)/deciLiter    Allergies    No Known Allergies    Intolerances      PAST MEDICAL & SURGICAL HISTORY:  DM (diabetes mellitus)  HTN (hypertension)  No significant past surgical history    FAMILY HISTORY:  mother with stroke    SOCIAL HISTORY:  former smoker    Review of Systems:  Constitutional: No fevers or chills.                    Eyes, Ears, Mouth, Throat: No vision loss   Respiratory: + shortness of breath                              Cardiovascular: No chest pain or palpitations  Gastrointestinal: + nausea or vomiting.                                         Genitourinary: No urinary incontinence or burning on urination.  Musculoskeletal: No joint pain.                                                           Dermatologic: No rash.  Neurological: as per HPI                                                                      Psychiatric: No behavioral problems.  Endocrine: No known hypoglycemia.               Hematologic/Lymphatic: No easy bleeding.    O:  Vital Signs Last 24 Hrs  T(C): 36.7, Max: 37 (06-26 @ 04:45)  T(F): 98, Max: 98.6 (06-26 @ 04:45)  HR: 92 (65 - 92)  BP: 136/80 (109/61 - 136/80)  BP(mean): --  RR: 17 (17 - 18)  SpO2: 96% (95% - 96%)    General Exam:   General appearance: No acute distress                 Cardiovascular: Pedal dorsalis pulses intact bilaterally    Mental Status: Orientated to self, date and place.  Attention intact.  No dysarthria, aphasia or neglect.  Knowledge intact.  Registration intact.  Short and long term memory grossly intact.      Cranial Nerves: CN I - not tested.  PERRL, EOMI, VFF, no nystagmus or diplopia.  No APD.  Fundi not visualized.  CN V1-3 intact to light touch and pinprick.  No facial asymmetry.  Hearing intact to finger rub bilaterally.  Tongue, uvula and palate midline.  Sternocleidomastoid and Trapezius intact bilaterally.    Motor:   Tone: normal.                  Strength intact throughout  No pronator drift bilaterally                      No dysmetria on finger-nose-finger or heel-shin-heel  No truncal ataxia.  No resting, postural or action tremor.  No myoclonus.    Sensation: intact to light touch, pinprick, vibration and proprioception    Deep Tendon Reflexes: 1+ bilateral biceps, triceps, brachioradialis, knee and ankle  Toes flexor bilaterally    Gait: normal and stable.    Other:     LABS:                        13.7   9.6   )-----------( 265      ( 25 Jun 2017 05:50 )             39.9     06-25    143  |  109<H>  |  15  ----------------------------<  113<H>  3.6   |  27  |  0.68    Ca    8.5      25 Jun 2017 05:50  Phos  3.5     06-25  Mg     2.0     06-25    TPro  7.0  /  Alb  3.0<L>  /  TBili  0.4  /  DBili  x   /  AST  19  /  ALT  37  /  AlkPhos  71  06-25            RADIOLOGY & ADDITIONAL STUDIES:    EKG:  Diagnosis Line Normal sinus rhythm  T wave abnormality, consider anterior ischemia  Abnormal ECG      CTH (images reviewed Negative study.

## 2017-06-26 NOTE — PROGRESS NOTE ADULT - SUBJECTIVE AND OBJECTIVE BOX
EVETTE ARIAS  MRN-[id number]    Patient is a 62y old  Female who presents with a chief complaint of Chest pain (2017 19:18)      patient seen and examined   s no cp but feeling on and off dizzi    MEDICATIONS  (STANDING):  aspirin  chewable 81milliGRAM(s) Oral daily  atorvastatin 40milliGRAM(s) Oral at bedtime  insulin lispro (HumaLOG) corrective regimen sliding scale  SubCutaneous Before meals and at bedtime  dextrose 5%. 1000milliLiter(s) IV Continuous <Continuous>  dextrose 50% Injectable 12.5Gram(s) IV Push once  dextrose 50% Injectable 25Gram(s) IV Push once  dextrose 50% Injectable 25Gram(s) IV Push once  metFORMIN 500milliGRAM(s) Oral two times a day with meals  ergocalciferol [id number](s) Oral <User Schedule>  penicillin  VK 500milliGRAM(s) Oral every 6 hours  fluconAZOLE   Tablet 100milliGRAM(s) Oral daily  fluticasone propionate 50 MICROgram(s)/spray Nasal Spray 1Spray(s) Both Nostrils two times a day  metoprolol 25milliGRAM(s) Oral two times a day  enalapril 5milliGRAM(s) Oral daily  sodium chloride 0.9%. 1000milliLiter(s) IV Continuous <Continuous>      MEDICATIONS  (PRN):  dextrose Gel 1Dose(s) Oral once PRN Blood Glucose LESS THAN 70 milliGRAM(s)/deciLiter  glucagon  Injectable 1milliGRAM(s) IntraMuscular once PRN Glucose <70 milliGRAM(s)/deciLiter      Allergies    No Known Allergies    Intolerances        PAST MEDICAL & SURGICAL HISTORY:  DM (diabetes mellitus)  HTN (hypertension)  No significant past surgical history      FAMILY HISTORY:  No pertinent family history in first degree relatives      SOCIAL HISTORY  Smoking History:     REVIEW OF SYSTEMS:    CONSTITUTIONAL:  Fevers [  ]  Yes  [x  ]  No                                   chills [  ]  Yes  [x  ]  No                               sweats  [  ]  Yes  [x  ]  No                                fatigue [  ]  Yes  [ x ]  No    HEENT:  Eyes:  Diplopia or blurred vision [  ]  Yes  [x  ]  No    ENT:                        earache  [  ]  Yes  [x  ]  No                             sore throat  [  ]  Yes  [x  ]  No                            runny nose.  [  ]  Yes  [ x ]  No    CARDIOVASCULAR:       chest pain  [  ]  Yes  [x  ]  No                        squeezing, tightness,  [  ]  Yes  [x  ]  No                                      palpitations.  [  ]  Yes  [ x ]  No    RESPIRATORY:             Cough [  ]  Yes  [ x ]  No                                  Wheezing [  ]  Yes  [  xx]  No                                Hemoptysis [  ]  Yes  [x  ]  No                                      Sputum [  ]  Yes  [ x ]  No                   Shortness of Breathe [  ]  Yes  [xx  ]  No    GASTROINTESTINAL:      Abdominal pain  [  ]  Yes  [x  ]  No                                                    Nausea  [  ]  Yes  [x x ]  No                                                   Vomiting [  ]  Yes  [  x]  No                                              Constipation [  ]  Yes  [x  ]  No                                                    Diarrhea [  ]  Yes  x[  ]  No    GENITOURINARY:           Incontinence [  ]  Yes  [  x]  No                                                Dysuria [  ]  Yes  [x  ]  No                                      Blood in Urine  [  ]  Yes  [x  ]  No                          Frequency or urgency.  [  ]  Yes  [x  ]  No    NEUROLOGIC:                     Paresthesias  [  ]  Yes  [ x ]  No                                             fasciculations  [  ]  Yes  [ x ]  No                                seizures or weakness.  [  ]  Yes  [ x ]  No                                                   Headache [  ]  Yes  [ x ]  No                                  Loss of Conciousness [  ]  Yes  [x  ]  No                                                     Insomnia [  ]  Yes  [ x ]  No  c/o dizziness  PSYCHIATRIC:    Disorder of thought or mood.  [  ]  Yes  [  x]  No                                                           Anxiety [  ]  Yes  [x  ]  No                                                      Depression [  ]  Yes  [xx  ]  No                                                         Dementia [  ]  Yes  [x  ]  No    Vital Signs Last 24 Hrs  T(C): 36.6, Max: 37.1 (06-25 @ 16:10)  T(F): 97.8, Max: 98.7 (06-25 @ 16:10)  HR: 70 (65 - 73)  BP: 117/62 (109/61 - 133/74)  BP(mean): --  RR: 17 (17 - 18)  SpO2: 95% (95% - 96%)  I&O's Detail      PHYSICAL EXAMINATION:    GENERAL: The patient is a well-developed, well-nourished _obese female____in no apparent distress.     HEENT: Head is normocephalic and atraumatic. Extraocular muscles are intact. Mucous membranes are moist.     NECK: Supple. jvd [  ]  Yes  [ x ]  No    LUNGS: Clear to auscultation  [x  ]  Yes  [  ]  No                               wheezing, [  ]  Yes  [ x ]  No                                      rales  [  ]  Yes  [x  ]  No                                    rhonchi  [  ]  Yes  [ x ]  No                 Respirations labored  [  ]  Yes  [x  ]  No    HEART: Regular rate and rhythm  [x  ]  Yes  [  ]  No                                        Murmur [  ]  Yes  [ x ]  No                                              rub  [  ]  Yes  [ x ]  No                                          gallop  [  ]  Yes  [ x ]  No    ABDOMEN:                                 Soft, [ x ]  Yes  [  ]  No                                             nontender [ x ]  Yes  [  ]  No                                             distended.[  ]  Yes  [x  ]  No                            hepatosplenomegaly ,[  ]  Yes  [ x ]  No                                                    BS   [x  ]  Yes  [  ]  No    EXTREMITIES:                cyanosis, [  ]  Yes  [x  ]  No                                       clubbing,   [  ]  Yes  [x  ]  No                                               rash, [  ]  Yes  [x  ]  No                                           edema.  [  ]  Yes  [ x ]  No    NEUROLOGIC: Alert and Oriented  [x  ] Person [ x ] Time  [x ] Place                                    Cranial nerves intact    [x  ]  Yes  [   No                                               sensory Intact  [ x ]  Yes  [  ]  No                                                  motor WNL   [ x ]  Yes  [  ]  No                                                Chris Paresis  [  ]  Yes  [ x ]  No  DTR  babinski+ or -      LABS:                        13.7   9.6   )-----------( 265      ( 2017 05:50 )             39.9     06-25    143  |  109<H>  |  15  ----------------------------<  113<H>  3.6   |  27  |  0.68    Ca    8.5      2017 05:50  Phos  3.5     06-25  Mg     2.0     06-25    TPro  7.0  /  Alb  3.0<L>  /  TBili  0.4  /  DBili  x   /  AST  19  /  ALT  37  /  AlkPhos  71  06-25      Urinalysis Basic - ( 2017 17:10 )    Color: Yellow / Appearance: Clear / S.005 / pH: x  Gluc: x / Ketone: Trace  / Bili: Negative / Urobili: Negative   Blood: x / Protein: Negative / Nitrite: Negative   Leuk Esterase: Negative / RBC: x / WBC x   Sq Epi: x / Non Sq Epi: x / Bacteria: x        CARDIAC MARKERS ( 2017 13:31 )  <0.015 ng/mL / x     / 47 U/L / x     / x      CARDIAC MARKERS ( 2017 05:50 )  <0.015 ng/mL / x     / 39 U/L / x     / x      CARDIAC MARKERS ( 2017 21:39 )  <0.015 ng/mL / x     / 45 U/L / x     / <1.0 ng/mL  CARDIAC MARKERS ( 2017 14:51 )  <0.015 ng/mL / x     / 75 U/L / x     / <1.0 ng/mL        Serum Pro-Brain Natriuretic Peptide: 78 pg/mL ( @ 18:59)          MICROBIOLOGY:Culture - Urine (17 @ 21:28)    Specimen Source: .Urine Clean Catch (Midstream)    Culture Results:   <10,000 CFU/ml  Normal Urogenital daniel present        RADIOLOGY & ADDITIONAL STUDIES:EXAM:  CT BRAIN                            PROCEDURE DATE:  2017        INTERPRETATION:  CT head performed noncontrast mode. Patient has   dizziness and hypertension with nausea and vomiting for one week. Time of   study 4:15 PM. Bony structures are unremarkable.    In the head ventricular and cortical sulcal size is within normal limits.    No intracranial blood collections or subarachnoid hemorrhages are evident.    There is no sense of acute territorial infarct, mass, or edema.    IMPRESSION: Negative study.      CXR:    Ct scan chest:    ekg;    echo:

## 2017-06-26 NOTE — CONSULT NOTE ADULT - ASSESSMENT
dizziness 1. dizziness likely vasovagal given worsening with standing and improvement with IVF  recommend to check orthostatic VS (unable to locate)  MRI brain w/o abby to r/o stroke  address chest pain per primary team    2. HTN

## 2017-06-26 NOTE — PROGRESS NOTE ADULT - ASSESSMENT
63 y/o F pt with PMHx of HTN,  DM, HTN, presents to ED c/o intermittent substernal chest pain that radiates to right arm with 7/10 of intensity. Pt also has associated symptoms of dizziness, nausea, diaphoresis, fluctuating blood pressure, lightheadedness x 1 week.       DC plan to-ceja if dizziness improved

## 2017-06-26 NOTE — PROGRESS NOTE ADULT - PROBLEM SELECTOR PLAN 1
- ACS ruled out   - ECHO wnl, no further cardiac workup   - No arrythmia noted on tele  - Cardio Dr Wong

## 2017-06-26 NOTE — DISCHARGE NOTE ADULT - MEDICATION SUMMARY - MEDICATIONS TO TAKE
I will START or STAY ON the medications listed below when I get home from the hospital:    aspirin 81 mg oral tablet, chewable  -- 1 tab(s) by mouth once a day  -- Indication: For cardioprotective     enalapril 5 mg oral tablet  -- 1 tab(s) by mouth once a day  -- Indication: For Hypertension    metFORMIN 500 mg oral tablet  -- 1 tab(s) by mouth 2 times a day (with meals)  -- Indication: For Diabetes    atorvastatin 40 mg oral tablet  -- 1 tab(s) by mouth once a day (at bedtime)  -- Indication: For Hyperlipidemia    Toprol-XL 25 mg oral tablet, extended release  -- 1 tab(s) by mouth once a day  -- It is very important that you take or use this exactly as directed.  Do not skip doses or discontinue unless directed by your doctor.  May cause drowsiness.  Alcohol may intensify this effect.  Use care when operating dangerous machinery.  Some non-prescription drugs may aggravate your condition.  Read all labels carefully.  If a warning appears, check with your doctor before taking.  Swallow whole.  Do not crush.  Take with food or milk.  This drug may impair the ability to drive or operate machinery.  Use care until you become familiar with its effects.    -- Indication: For Hypertension    Vitamin D2 50,000 intl units (1.25 mg) oral capsule  -- 1 cap(s) by mouth once a week  -- Indication: For supplements

## 2017-06-26 NOTE — DISCHARGE NOTE ADULT - HOSPITAL COURSE
61 y/o F pt with PMHx of HTN,  DM, HTN, presents to ED c/o intermittent substernal chest pain that radiates to right arm with 7/10 of intensity. Pt also has associated symptoms of dizziness, nausea, diaphoresis, fluctuating blood pressure, lightheadedness x 1 week.       Given the risk factors, initial admitted to rule out ACS. ACS ruled out . ECHO wnl, no further cardiac workup . No arrhythmia noted on tele. Cardio Dr Wong. Has Dehydration. Pt presented with dizziness ( no vertigo symptoms) . Head CT negative for acute pathology -. Orthostatic negative , but BP overall < 120/80 . Resolved upon IVF.  Neuro  consult Dr Leal. MRI barin done to rule out stroke, shows-------------------. For DM (diabetes mellitus).  Hold  home J[place] . Continued with metformin  , HSS.  A1c is 6.1. Blood sugars well controlled. For HTN (hypertension) in diabetics  goal BP is 130/80, since BP usually less than < 120/80 , as patient complain of dizziness,  decrease the dose of Lopressor to 25 mg BID and enalapril to 5 mg. Monitor the BP. Has Vaginitis outpatient treatment for vaginitis with fluconazole continued.     Patient get stable over time, outpatient f/up with PCP recommended. Plan discussed with the attending. Stable to be discharged. 63 y/o F pt with PMHx of HTN,  DM, HTN, presents to ED c/o intermittent substernal chest pain that radiates to right arm with 7/10 of intensity. Pt also has associated symptoms of dizziness, nausea, diaphoresis, fluctuating blood pressure, lightheadedness x 1 week.       Given the risk factors, initial admitted to rule out ACS. ACS ruled out . ECHO wnl, no further cardiac workup . No arrhythmia noted on tele. Cardio Dr Wong. Has Dehydration. Pt presented with dizziness ( no vertigo symptoms) . Head CT negative for acute pathology -. Orthostatic negative , but BP overall < 120/80 . Resolved upon IVF.  Neuro  consult Dr Leal. MRI barin done to rule out stroke, shows negative for ischemia. . For DM (diabetes mellitus).  Hold  home J[place] . Continued with metformin  , HSS.  A1c is 6.1. Blood sugars well controlled. For HTN (hypertension) in diabetics  goal BP is 130/80, since BP usually less than < 120/80 , as patient complain of dizziness,  decrease the dose of Lopressor to 25 ER a day nd enalapril to 5 mg. Monitor the BP. Has Vaginitis outpatient treatment for vaginitis with fluconazole continued. She  completed the treatment with fluconazole     Patient get stable over time, outpatient f/up with PCP recommended. Plan discussed with the attending. Stable to be discharged.

## 2017-06-27 VITALS
SYSTOLIC BLOOD PRESSURE: 117 MMHG | HEART RATE: 75 BPM | RESPIRATION RATE: 16 BRPM | DIASTOLIC BLOOD PRESSURE: 74 MMHG | TEMPERATURE: 97 F | OXYGEN SATURATION: 97 %

## 2017-06-27 LAB
ANION GAP SERPL CALC-SCNC: 7 MMOL/L — SIGNIFICANT CHANGE UP (ref 5–17)
BUN SERPL-MCNC: 22 MG/DL — HIGH (ref 7–18)
CALCIUM SERPL-MCNC: 8.4 MG/DL — SIGNIFICANT CHANGE UP (ref 8.4–10.5)
CHLORIDE SERPL-SCNC: 108 MMOL/L — SIGNIFICANT CHANGE UP (ref 96–108)
CO2 SERPL-SCNC: 28 MMOL/L — SIGNIFICANT CHANGE UP (ref 22–31)
CREAT SERPL-MCNC: 0.71 MG/DL — SIGNIFICANT CHANGE UP (ref 0.5–1.3)
GLUCOSE SERPL-MCNC: 106 MG/DL — HIGH (ref 70–99)
HCT VFR BLD CALC: 40.2 % — SIGNIFICANT CHANGE UP (ref 34.5–45)
HGB BLD-MCNC: 13.7 G/DL — SIGNIFICANT CHANGE UP (ref 11.5–15.5)
MAGNESIUM SERPL-MCNC: 1.7 MG/DL — SIGNIFICANT CHANGE UP (ref 1.6–2.6)
MCHC RBC-ENTMCNC: 31.8 PG — SIGNIFICANT CHANGE UP (ref 27–34)
MCHC RBC-ENTMCNC: 34 GM/DL — SIGNIFICANT CHANGE UP (ref 32–36)
MCV RBC AUTO: 93.5 FL — SIGNIFICANT CHANGE UP (ref 80–100)
PHOSPHATE SERPL-MCNC: 3.5 MG/DL — SIGNIFICANT CHANGE UP (ref 2.5–4.5)
PLATELET # BLD AUTO: 267 K/UL — SIGNIFICANT CHANGE UP (ref 150–400)
POTASSIUM SERPL-MCNC: 4 MMOL/L — SIGNIFICANT CHANGE UP (ref 3.5–5.3)
POTASSIUM SERPL-SCNC: 4 MMOL/L — SIGNIFICANT CHANGE UP (ref 3.5–5.3)
RBC # BLD: 4.3 M/UL — SIGNIFICANT CHANGE UP (ref 3.8–5.2)
RBC # FLD: 12.5 % — SIGNIFICANT CHANGE UP (ref 10.3–14.5)
SODIUM SERPL-SCNC: 143 MMOL/L — SIGNIFICANT CHANGE UP (ref 135–145)
WBC # BLD: 9.8 K/UL — SIGNIFICANT CHANGE UP (ref 3.8–10.5)
WBC # FLD AUTO: 9.8 K/UL — SIGNIFICANT CHANGE UP (ref 3.8–10.5)

## 2017-06-27 PROCEDURE — 84443 ASSAY THYROID STIM HORMONE: CPT

## 2017-06-27 PROCEDURE — 80061 LIPID PANEL: CPT

## 2017-06-27 PROCEDURE — 70450 CT HEAD/BRAIN W/O DYE: CPT

## 2017-06-27 PROCEDURE — 84100 ASSAY OF PHOSPHORUS: CPT

## 2017-06-27 PROCEDURE — 70551 MRI BRAIN STEM W/O DYE: CPT

## 2017-06-27 PROCEDURE — 70551 MRI BRAIN STEM W/O DYE: CPT | Mod: 26

## 2017-06-27 PROCEDURE — 85027 COMPLETE CBC AUTOMATED: CPT

## 2017-06-27 PROCEDURE — 81003 URINALYSIS AUTO W/O SCOPE: CPT

## 2017-06-27 PROCEDURE — 71045 X-RAY EXAM CHEST 1 VIEW: CPT

## 2017-06-27 PROCEDURE — 36415 COLL VENOUS BLD VENIPUNCTURE: CPT

## 2017-06-27 PROCEDURE — 87086 URINE CULTURE/COLONY COUNT: CPT

## 2017-06-27 PROCEDURE — 97161 PT EVAL LOW COMPLEX 20 MIN: CPT

## 2017-06-27 PROCEDURE — 93005 ELECTROCARDIOGRAM TRACING: CPT

## 2017-06-27 PROCEDURE — 94640 AIRWAY INHALATION TREATMENT: CPT

## 2017-06-27 PROCEDURE — 83880 ASSAY OF NATRIURETIC PEPTIDE: CPT

## 2017-06-27 PROCEDURE — 83036 HEMOGLOBIN GLYCOSYLATED A1C: CPT

## 2017-06-27 PROCEDURE — 99285 EMERGENCY DEPT VISIT HI MDM: CPT | Mod: 25

## 2017-06-27 PROCEDURE — 82550 ASSAY OF CK (CPK): CPT

## 2017-06-27 PROCEDURE — 82306 VITAMIN D 25 HYDROXY: CPT

## 2017-06-27 PROCEDURE — 82009 KETONE BODYS QUAL: CPT

## 2017-06-27 PROCEDURE — 80048 BASIC METABOLIC PNL TOTAL CA: CPT

## 2017-06-27 PROCEDURE — 93306 TTE W/DOPPLER COMPLETE: CPT

## 2017-06-27 PROCEDURE — 84484 ASSAY OF TROPONIN QUANT: CPT

## 2017-06-27 PROCEDURE — 83735 ASSAY OF MAGNESIUM: CPT

## 2017-06-27 PROCEDURE — 82553 CREATINE MB FRACTION: CPT

## 2017-06-27 PROCEDURE — 80053 COMPREHEN METABOLIC PANEL: CPT

## 2017-06-27 RX ORDER — METOPROLOL TARTRATE 50 MG
1 TABLET ORAL
Qty: 60 | Refills: 0 | OUTPATIENT
Start: 2017-06-27 | End: 2017-07-27

## 2017-06-27 RX ORDER — METOPROLOL TARTRATE 50 MG
1 TABLET ORAL
Qty: 30 | Refills: 0 | OUTPATIENT
Start: 2017-06-27 | End: 2017-07-27

## 2017-06-27 RX ORDER — METFORMIN HYDROCHLORIDE 850 MG/1
1 TABLET ORAL
Qty: 60 | Refills: 0 | OUTPATIENT
Start: 2017-06-27 | End: 2017-07-27

## 2017-06-27 RX ORDER — METOPROLOL TARTRATE 50 MG
25 TABLET ORAL DAILY
Qty: 0 | Refills: 0 | Status: DISCONTINUED | OUTPATIENT
Start: 2017-06-27 | End: 2017-06-27

## 2017-06-27 RX ORDER — ATORVASTATIN CALCIUM 80 MG/1
1 TABLET, FILM COATED ORAL
Qty: 30 | Refills: 0 | OUTPATIENT
Start: 2017-06-27 | End: 2017-07-27

## 2017-06-27 RX ORDER — ERGOCALCIFEROL 1.25 MG/1
1 CAPSULE ORAL
Qty: 4 | Refills: 0 | OUTPATIENT
Start: 2017-06-27 | End: 2017-07-27

## 2017-06-27 RX ORDER — ASPIRIN/CALCIUM CARB/MAGNESIUM 324 MG
1 TABLET ORAL
Qty: 30 | Refills: 0 | OUTPATIENT
Start: 2017-06-27 | End: 2017-07-27

## 2017-06-27 RX ADMIN — Medication 500 MILLIGRAM(S): at 00:10

## 2017-06-27 RX ADMIN — Medication 5 MILLIGRAM(S): at 05:51

## 2017-06-27 RX ADMIN — METFORMIN HYDROCHLORIDE 500 MILLIGRAM(S): 850 TABLET ORAL at 18:09

## 2017-06-27 RX ADMIN — Medication 500 MILLIGRAM(S): at 13:05

## 2017-06-27 RX ADMIN — Medication 81 MILLIGRAM(S): at 13:04

## 2017-06-27 RX ADMIN — Medication 25 MILLIGRAM(S): at 18:08

## 2017-06-27 RX ADMIN — Medication 1 SPRAY(S): at 18:09

## 2017-06-27 RX ADMIN — METFORMIN HYDROCHLORIDE 500 MILLIGRAM(S): 850 TABLET ORAL at 11:06

## 2017-06-27 RX ADMIN — Medication 500 MILLIGRAM(S): at 05:51

## 2017-06-27 RX ADMIN — Medication 25 MILLIGRAM(S): at 05:52

## 2017-06-27 RX ADMIN — Medication 1 SPRAY(S): at 05:52

## 2017-06-27 NOTE — PROGRESS NOTE ADULT - SUBJECTIVE AND OBJECTIVE BOX
EVETTE ARIAS  MRN-[id number]    Patient is a 62y old  Female who presents with a chief complaint of Chest pain (26 Jun 2017 21:10)    patient seen and examined   s doing better , no dizziness or cp    MEDICATIONS  (STANDING):  aspirin  chewable 81 milliGRAM(s) Oral daily  atorvastatin 40 milliGRAM(s) Oral at bedtime  insulin lispro (HumaLOG) corrective regimen sliding scale   SubCutaneous Before meals and at bedtime  dextrose 5%. 1000 milliLiter(s) (50 mL/Hr) IV Continuous <Continuous>  dextrose 50% Injectable 12.5 Gram(s) IV Push once  dextrose 50% Injectable 25 Gram(s) IV Push once  dextrose 50% Injectable 25 Gram(s) IV Push once  metFORMIN 500 milliGRAM(s) Oral two times a day with meals  ergocalciferol [id number] Unit(s) Oral <User Schedule>  fluticasone propionate 50 MICROgram(s)/spray Nasal Spray 1 Spray(s) Both Nostrils two times a day  enalapril 5 milliGRAM(s) Oral daily  sodium chloride 0.9%. 1000 milliLiter(s) (60 mL/Hr) IV Continuous <Continuous>  metoprolol succinate ER 25 milliGRAM(s) Oral daily      MEDICATIONS  (PRN):  dextrose Gel 1 Dose(s) Oral once PRN Blood Glucose LESS THAN 70 milliGRAM(s)/deciLiter  glucagon  Injectable 1 milliGRAM(s) IntraMuscular once PRN Glucose <70 milliGRAM(s)/deciLiter      Allergies    No Known Allergies    Intolerances        PAST MEDICAL & SURGICAL HISTORY:  DM (diabetes mellitus)  HTN (hypertension)  No significant past surgical history      FAMILY HISTORY:  No pertinent family history in first degree relatives      SOCIAL HISTORY  Smoking History:     REVIEW OF SYSTEMS:    CONSTITUTIONAL:  Fevers [  ]  Yes  [x  ]  No                                   chills [  ]  Yes  [ x ]  No                               sweats  [  ]  Yes  [ x ]  No                                fatigue [x  ]  Yes  [  ]  No    HEENT:  Eyes:  Diplopia or blurred vision [  ]  Yes  [x  ]  No    ENT:                        earache  [  ]  Yes  [ x ]  No                             sore throat  [  ]  Yes  [ x ]  No                            runny nose.  [  ]  Yes  [ x ]  No    CARDIOVASCULAR:       chest pain  [  ]  Yes  [ x ]  No                        squeezing, tightness,  [  ]  Yes  [x  ]  No                                      palpitations.  [  ]  Yes  [ x ]  No    RESPIRATORY:             Cough [  ]  Yes  [xx  ]  No                                  Wheezing [  ]  Yes  [x  ]  No                                Hemoptysis [  ]  Yes  [x  ]  No                                      Sputum [  ]  Yes  [ x ]  No                   Shortness of Breathe [  ]  Yes  [ x ]  No    GASTROINTESTINAL:      Abdominal pain  [  ]  Yes  [x  ]  No                                                    Nausea  [  ]  Yes  [x  ]  No                                                   Vomiting [  ]  Yes  [ x ]  No                                              Constipation [  ]  Yes  [x  ]  No                                                    Diarrhea [  ]  Yes  [x  ]  No    GENITOURINARY:           Incontinence [  ]  Yes  [x  ]  No                                                Dysuria [  ]  Yes  [x  ]  No                                      Blood in Urine  [  ]  Yes  [x  ]  No                          Frequency or urgency.  [  ]  Yes  [ x ]  No    NEUROLOGIC:                     Paresthesias  [  ]  Yes  [x  ]  No                                             fasciculations  [  ]  Yes  [ x ]  No                                seizures or weakness.  [  ]  Yes  [ x ]  No                                                   Headache [  ]  Yes  [ x ]  No                                  Loss of Conciousness [  ]  Yes  [x  ]  No                                                     Insomnia [  ]  Yes  [x  ]  No    PSYCHIATRIC:    Disorder of thought or mood.  [  ]  Yes  [ x ]  No                                                           Anxiety [  ]  Yes  [x  ]  No                                                      Depression [  ]  Yes  [ x ]  No                                                         Dementia [  ]  Yes  [ x ]  No    Vital Signs Last 24 Hrs  T(C): 36.3 (27 Jun 2017 16:20), Max: 36.8 (26 Jun 2017 20:00)  T(F): 97.3 (27 Jun 2017 16:20), Max: 98.2 (26 Jun 2017 20:00)  HR: 75 (27 Jun 2017 16:20) (63 - 80)  BP: 117/74 (27 Jun 2017 16:20) (103/61 - 128/82)  BP(mean): --  RR: 16 (27 Jun 2017 16:20) (16 - 18)  SpO2: 97% (27 Jun 2017 16:20) (94% - 98%)  I&O's Detail    26 Jun 2017 07:01  -  27 Jun 2017 07:00  --------------------------------------------------------  IN:    sodium chloride 0.9%.: 660 mL  Total IN: 660 mL    OUT:  Total OUT: 0 mL    Total NET: 660 mL          PHYSICAL EXAMINATION:    GENERAL: The patient is a well-developed, well-nourished _____in no apparent distress.     HEENT: Head is normocephalic and atraumatic. Extraocular muscles are intact. Mucous membranes are moist.     NECK: Supple. jvd [  ]  Yes  [ x ]  No    LUNGS: Clear to auscultation  [x  ]  Yes  [  ]  No                               wheezing, [  ]  Yes  [ x ]  No                                      rales  [  ]  Yes  [x  ]  No                                    rhonchi  [  ]  Yes  [ x ]  No                 Respirations labored  [  ]  Yes  [x  ]  No    HEART: Regular rate and rhythm  [x  ]  Yes  [  ]  No                                        Murmur [  ]  Yes  [ x ]  No                                              rub  [  ]  Yes  [x  ]  No                                          gallop  [  ]  Yes  [x  ]  No    ABDOMEN:                                 Soft, [x  ]  Yes  [  ]  No                                             nontender [ x ]  Yes  [  ]  No                                             distended.[  ]  Yes  [x  ]  No                            hepatosplenomegaly ,[  ]  Yes  [x  ]  No                                                    BS   [ x]  Yes  [  ]  No    EXTREMITIES:                cyanosis, [  ]  Yes  [ x ]  No                                       clubbing,   [  ]  Yes  [ x ]  No                                               rash, [  ]  Yes  [ x ]  No                                           edema.  [  ]  Yes  [ x ]  No    NEUROLOGIC: Alert and Oriented  [ x ] Person [x  ] Time  [ x] Place                                    Cranial nerves intact    [ x ]  Yes  [  ]  No                                               sensory Intact  [ x ]  Yes  [  ]  No                                                  motor WNL   [ x ]  Yes  [  ]  No                                                Chris Paresis  [  ]  Yes  [x  ]  No  DTR  babinski+ or -      LABS:                        13.7   9.8   )-----------( 267      ( 27 Jun 2017 05:54 )             40.2     06-27    143  |  108  |  22<H>  ----------------------------<  106<H>  4.0   |  28  |  0.71    Ca    8.4      27 Jun 2017 05:54  Phos  3.5     06-27  Mg     1.7     06-27                  Serum Pro-Brain Natriuretic Peptide: 78 pg/mL (06-24-17 @ 18:59)          MICROBIOLOGY:Culture - Urine (06.24.17 @ 21:28)    Specimen Source: .Urine Clean Catch (Midstream)    Culture Results:   <10,000 CFU/ml  Normal Urogenital daniel present        RADIOLOGY & ADDITIONAL STUDIES:< from: MRI Head w/o Cont (06.27.17 @ 15:17) >  IMPRESSION:  No evidence of acute infarct.      < end of copied text >      CXR:    Ct scan chest:    ekg;    echo:

## 2017-06-27 NOTE — PHYSICAL THERAPY INITIAL EVALUATION ADULT - GENERAL OBSERVATIONS, REHAB EVAL
pt observe I BRP, claims dizziness persists, appears tolerable, steady gait with stairs assessment, reinforce oob to chair for lunch

## 2017-06-29 DIAGNOSIS — R42 DIZZINESS AND GIDDINESS: ICD-10-CM

## 2017-06-29 DIAGNOSIS — Z79.84 LONG TERM (CURRENT) USE OF ORAL HYPOGLYCEMIC DRUGS: ICD-10-CM

## 2017-06-29 DIAGNOSIS — N76.0 ACUTE VAGINITIS: ICD-10-CM

## 2017-06-29 DIAGNOSIS — I10 ESSENTIAL (PRIMARY) HYPERTENSION: ICD-10-CM

## 2017-06-29 DIAGNOSIS — E86.0 DEHYDRATION: ICD-10-CM

## 2017-06-29 DIAGNOSIS — R07.9 CHEST PAIN, UNSPECIFIED: ICD-10-CM

## 2017-06-29 DIAGNOSIS — E11.9 TYPE 2 DIABETES MELLITUS WITHOUT COMPLICATIONS: ICD-10-CM

## 2018-11-30 NOTE — PATIENT PROFILE ADULT. - PURPOSEFUL PROACTIVE ROUNDING
Reason For Visit  SANGEETHA JACKSON is here today for a nurse visit for immunizations.   Patient accompanied by mother .      Current Meds  Allegra SUSP;  Therapy: (Recorded:[id number]) to Recorded  Nasacort Allergy 24HR 55 MCG/ACT Nasal Aerosol;  Therapy: (Recorded:[id number]) to Recorded    Allergies  No Known Drug Allergies  Animal dander - Cats  Seasonal Allergies    Vitals  Vital Signs    Recorded: [id number] 05:47PM   Temperature 97.6 F     Discussion/Summary    Patient was observed after administration and no side effects noted., Patient discharged to home., Flu Shot screening form completed.   [facility] [place] VIS was given to parent/legal guardian/designated adult. Risks and benefits of vaccines was discussed and questions answered. Parent/legal guardian/designated adult verbalized understanding and signed consent for vaccine.          Assessment   1. Well child visit (Z00.129)    Plan  Immunizations    1. Fluzone Quadrivalent 0.5 ML Intramuscular Suspension    Signatures   Electronically signed by : ELLIE Samson; Sep 13 2018  5:57PM CST     Patient

## 2019-02-06 NOTE — ED ADULT TRIAGE NOTE - NS ED NOTE AC HIGH RISK COUNTRIES
No Implemented All Fall Risk Interventions:  [place] to call system. Call bell, personal items and telephone within reach. Instruct patient to call for assistance. Room bathroom lighting operational. Non-slip footwear when patient is off stretcher. Physically safe environment: no spills, clutter or unnecessary equipment. Stretcher in lowest position, wheels locked, appropriate side rails in place. Provide visual cue, wrist band, yellow gown, etc. Monitor gait and stability. Monitor for mental status changes and reorient to person, place, and time. Review medications for side effects contributing to fall risk. Reinforce activity limits and safety measures with patient and family.

## 2023-02-04 PROBLEM — E11.9 TYPE 2 DIABETES MELLITUS WITHOUT COMPLICATIONS: Chronic | Status: ACTIVE | Noted: 2017-06-24

## 2023-02-04 PROBLEM — I10 ESSENTIAL (PRIMARY) HYPERTENSION: Chronic | Status: ACTIVE | Noted: 2017-06-24

## 2023-03-20 ENCOUNTER — NON-APPOINTMENT (OUTPATIENT)
Age: 68
End: 2023-03-20

## 2023-03-20 ENCOUNTER — APPOINTMENT (OUTPATIENT)
Dept: INTERNAL MEDICINE | Facility: CLINIC | Age: 68
End: 2023-03-20
Payer: MEDICARE

## 2023-03-20 ENCOUNTER — LABORATORY RESULT (OUTPATIENT)
Age: 68
End: 2023-03-20

## 2023-03-20 VITALS
BODY MASS INDEX: 28.97 KG/M2 | OXYGEN SATURATION: 96 % | HEART RATE: 97 BPM | DIASTOLIC BLOOD PRESSURE: 96 MMHG | SYSTOLIC BLOOD PRESSURE: 158 MMHG | HEIGHT: 58 IN | WEIGHT: 138 LBS

## 2023-03-20 DIAGNOSIS — M19.90 UNSPECIFIED OSTEOARTHRITIS, UNSPECIFIED SITE: ICD-10-CM

## 2023-03-20 DIAGNOSIS — Z82.49 FAMILY HISTORY OF ISCHEMIC HEART DISEASE AND OTHER DISEASES OF THE CIRCULATORY SYSTEM: ICD-10-CM

## 2023-03-20 DIAGNOSIS — Z78.9 OTHER SPECIFIED HEALTH STATUS: ICD-10-CM

## 2023-03-20 DIAGNOSIS — Z63.4 DISAPPEARANCE AND DEATH OF FAMILY MEMBER: ICD-10-CM

## 2023-03-20 DIAGNOSIS — N20.0 CALCULUS OF KIDNEY: ICD-10-CM

## 2023-03-20 DIAGNOSIS — Z86.59 PERSONAL HISTORY OF OTHER MENTAL AND BEHAVIORAL DISORDERS: ICD-10-CM

## 2023-03-20 PROBLEM — Z00.00 ENCOUNTER FOR PREVENTIVE HEALTH EXAMINATION: Status: ACTIVE | Noted: 2023-03-20

## 2023-03-20 PROCEDURE — G0439: CPT

## 2023-03-20 PROCEDURE — 93000 ELECTROCARDIOGRAM COMPLETE: CPT

## 2023-03-20 PROCEDURE — 36415 COLL VENOUS BLD VENIPUNCTURE: CPT

## 2023-03-20 SDOH — SOCIAL STABILITY - SOCIAL INSECURITY: DISSAPEARANCE AND DEATH OF FAMILY MEMBER: Z63.4

## 2023-03-20 NOTE — HEALTH RISK ASSESSMENT
[Good] : ~his/her~ current health as good [Yes] : Yes [Monthly or less (1 pt)] : Monthly or less (1 point) [1 or 2 (0 pts)] : 1 or 2 (0 points) [Never (0 pts)] : Never (0 points) [No] : In the past 12 months have you used drugs other than those required for medical reasons? No [No falls in past year] : Patient reported no falls in the past year [0] : 1) Little interest or pleasure doing things: Not at all (0) [1] : 2) Feeling down, depressed, or hopeless for several days (1) [No Retinopathy] : No retinopathy [Patient reported mammogram was normal] : Patient reported mammogram was normal [Patient reported colonoscopy was normal] : Patient reported colonoscopy was normal [HIV test declined] : HIV test declined [Hepatitis C test declined] : Hepatitis C test declined [None] : None [With Family] : lives with family [Unemployed] : unemployed [Less Than High School] : less than high school [] :  [# Of Children ___] : has [unfilled] children [Feels Safe at Home] : Feels safe at home [Fully functional (bathing, dressing, toileting, transferring, walking, feeding)] : Fully functional (bathing, dressing, toileting, transferring, walking, feeding) [Fully functional (using the telephone, shopping, preparing meals, housekeeping, doing laundry, using] : Fully functional and needs no help or supervision to perform IADLs (using the telephone, shopping, preparing meals, housekeeping, doing laundry, using transportation, managing medications and managing finances) [Smoke Detector] : smoke detector [Carbon Monoxide Detector] : carbon monoxide detector [Seat Belt] :  uses seat belt [Sunscreen] : uses sunscreen [Reviewed no changes] : Reviewed, no changes [Aggressive treatment] : aggressive treatment [Time Spent: ___ minutes] : Time Spent: [unfilled] minutes [Audit-CScore] : 1 [ZZN3Rimas] : 1 [EyeExamDate] : approx 01/2020 [Change in mental status noted] : No change in mental status noted [Language] : denies difficulty with language [Reports changes in hearing] : Reports no changes in hearing [Reports changes in vision] : Reports no changes in vision [MammogramDate] : 01/01/2021 [ColonoscopyDate] : 01/01/2018 [AdvancecareDate] : 03/20/2023

## 2023-03-20 NOTE — HISTORY OF PRESENT ILLNESS
[Family Member] : family member [FreeTextEntry1] : New patient \par ANnual physical exam [de-identified] : Patient is 67 year female  with PMH of HTN, DM type 2, OA , came today for annual physical exam\par

## 2023-03-20 NOTE — COUNSELING
[Fall prevention counseling provided] : Fall prevention counseling provided [Adequate lighting] : Adequate lighting [Use proper foot wear] : Use proper foot wear [Behavioral health counseling provided] : Behavioral health counseling provided [Potential consequences of obesity discussed] : Potential consequences of obesity discussed [Benefits of weight loss discussed] : Benefits of weight loss discussed [Structured Weight Management Program suggested:] : Structured weight management program suggested [Encouraged to maintain food diary] : Encouraged to maintain food diary [Weigh Self Weekly] : weigh self weekly [Decrease Portions] : decrease portions [Keep Food Diary] : keep food diary [Good understanding] : Patient has a good understanding of disease, goals and obesity follow-up plan [FreeTextEntry4] : 5 min

## 2023-03-23 LAB
25(OH)D3 SERPL-MCNC: 80.9 NG/ML
ALBUMIN SERPL ELPH-MCNC: 4.8 G/DL
ALP BLD-CCNC: 96 U/L
ALT SERPL-CCNC: 30 U/L
ANION GAP SERPL CALC-SCNC: 13 MMOL/L
APPEARANCE: ABNORMAL
AST SERPL-CCNC: 30 U/L
BASOPHILS # BLD AUTO: 0.13 K/UL
BASOPHILS NFR BLD AUTO: 1.3 %
BILIRUB SERPL-MCNC: 0.2 MG/DL
BILIRUBIN URINE: NEGATIVE
BLOOD URINE: NEGATIVE
BUN SERPL-MCNC: 14 MG/DL
CALCIUM SERPL-MCNC: 10.4 MG/DL
CHLORIDE SERPL-SCNC: 100 MMOL/L
CHOLEST SERPL-MCNC: 204 MG/DL
CO2 SERPL-SCNC: 26 MMOL/L
COLOR: ABNORMAL
CREAT SERPL-MCNC: 0.65 MG/DL
CREAT SPEC-SCNC: 126 MG/DL
EGFR: 95 ML/MIN/1.73M2
EOSINOPHIL # BLD AUTO: 0.22 K/UL
EOSINOPHIL NFR BLD AUTO: 2.3 %
ESTIMATED AVERAGE GLUCOSE: 192 MG/DL
GLUCOSE QUALITATIVE U: ABNORMAL
GLUCOSE SERPL-MCNC: 191 MG/DL
HBA1C MFR BLD HPLC: 8.3 %
HCT VFR BLD CALC: 44.9 %
HDLC SERPL-MCNC: 48 MG/DL
HGB BLD-MCNC: 14.7 G/DL
IMM GRANULOCYTES NFR BLD AUTO: 0.5 %
IRON SERPL-MCNC: 49 UG/DL
KETONES URINE: NORMAL
LDLC SERPL CALC-MCNC: 105 MG/DL
LEUKOCYTE ESTERASE URINE: NEGATIVE
LYMPHOCYTES # BLD AUTO: 2.09 K/UL
LYMPHOCYTES NFR BLD AUTO: 21.4 %
MAN DIFF?: NORMAL
MCHC RBC-ENTMCNC: 29.7 PG
MCHC RBC-ENTMCNC: 32.7 GM/DL
MCV RBC AUTO: 90.7 FL
MICROALBUMIN 24H UR DL<=1MG/L-MCNC: 8.2 MG/DL
MICROALBUMIN/CREAT 24H UR-RTO: 65 MG/G
MONOCYTES # BLD AUTO: 0.7 K/UL
MONOCYTES NFR BLD AUTO: 7.2 %
NEUTROPHILS # BLD AUTO: 6.57 K/UL
NEUTROPHILS NFR BLD AUTO: 67.3 %
NITRITE URINE: NEGATIVE
NONHDLC SERPL-MCNC: 156 MG/DL
PH URINE: 5.5
PLATELET # BLD AUTO: 323 K/UL
POTASSIUM SERPL-SCNC: 4.5 MMOL/L
PROT SERPL-MCNC: 8.1 G/DL
PROTEIN URINE: NORMAL
RBC # BLD: 4.95 M/UL
RBC # FLD: 13.2 %
SODIUM SERPL-SCNC: 139 MMOL/L
SPECIFIC GRAVITY URINE: 1.02
TRIGL SERPL-MCNC: 253 MG/DL
TSH SERPL-ACNC: 1.33 UIU/ML
UROBILINOGEN URINE: NORMAL
WBC # FLD AUTO: 9.76 K/UL

## 2023-06-01 ENCOUNTER — APPOINTMENT (OUTPATIENT)
Dept: CARDIOLOGY | Facility: CLINIC | Age: 68
End: 2023-06-01
Payer: MEDICARE

## 2023-06-01 VITALS
HEIGHT: 58 IN | TEMPERATURE: 98 F | WEIGHT: 135 LBS | HEART RATE: 83 BPM | SYSTOLIC BLOOD PRESSURE: 114 MMHG | OXYGEN SATURATION: 95 % | DIASTOLIC BLOOD PRESSURE: 75 MMHG | BODY MASS INDEX: 28.34 KG/M2

## 2023-06-01 DIAGNOSIS — R94.31 ABNORMAL ELECTROCARDIOGRAM [ECG] [EKG]: ICD-10-CM

## 2023-06-01 PROCEDURE — 99203 OFFICE O/P NEW LOW 30 MIN: CPT

## 2023-06-01 RX ORDER — ENALAPRIL MALEATE 5 MG/1
TABLET ORAL
Refills: 0 | Status: ACTIVE | COMMUNITY

## 2023-06-01 NOTE — HISTORY OF PRESENT ILLNESS
[FreeTextEntry1] : 68F with HTN, HLD who presents for eval of abnormal ECG\par \par Pt feeling well without complaints\par Some dyspnea with heavy exertion \par Patient denies chest pain, palpitations, dizziness, lightheadedness, syncope.\par Not particularly active, uses a walker\par \par Nonsmoker. Lives with her daughter\par \par ECG: SR with nonspecific ST changes\par \par Metformin 1000mg BID\par Tradjenta 5mg

## 2023-06-01 NOTE — DISCUSSION/SUMMARY
[FreeTextEntry1] : ST changes on ECG- some dyspnea with heavy exertion\par Will arrange for TTE\par \par BP good today on Enalapril, normal CMP\par \par Lipids borderline, on fish oil. Consider statin\par \par Continue to encourage healthy lifestyle

## 2023-06-01 NOTE — REVIEW OF SYSTEMS
[FreeTextEntry2] : 10 point review of systems is normal other than what is listed above in the history of present illness.

## 2023-06-08 ENCOUNTER — RX RENEWAL (OUTPATIENT)
Age: 68
End: 2023-06-08

## 2023-06-13 ENCOUNTER — APPOINTMENT (OUTPATIENT)
Dept: INTERNAL MEDICINE | Facility: CLINIC | Age: 68
End: 2023-06-13
Payer: MEDICARE

## 2023-06-13 PROCEDURE — 93306 TTE W/DOPPLER COMPLETE: CPT

## 2023-06-26 ENCOUNTER — RX RENEWAL (OUTPATIENT)
Age: 68
End: 2023-06-26

## 2023-07-12 ENCOUNTER — LABORATORY RESULT (OUTPATIENT)
Age: 68
End: 2023-07-12

## 2023-07-12 ENCOUNTER — APPOINTMENT (OUTPATIENT)
Dept: INTERNAL MEDICINE | Facility: CLINIC | Age: 68
End: 2023-07-12
Payer: MEDICARE

## 2023-07-12 VITALS
WEIGHT: 136 LBS | SYSTOLIC BLOOD PRESSURE: 134 MMHG | OXYGEN SATURATION: 96 % | HEIGHT: 58 IN | DIASTOLIC BLOOD PRESSURE: 83 MMHG | BODY MASS INDEX: 28.55 KG/M2 | HEART RATE: 80 BPM

## 2023-07-12 PROCEDURE — 99214 OFFICE O/P EST MOD 30 MIN: CPT | Mod: 25

## 2023-07-12 PROCEDURE — G0009: CPT

## 2023-07-12 PROCEDURE — 36415 COLL VENOUS BLD VENIPUNCTURE: CPT

## 2023-07-12 PROCEDURE — 90677 PCV20 VACCINE IM: CPT

## 2023-07-12 RX ORDER — METFORMIN HYDROCHLORIDE 500 MG/1
500 TABLET, COATED ORAL TWICE DAILY
Qty: 180 | Refills: 1 | Status: DISCONTINUED | COMMUNITY
Start: 2023-02-28 | End: 2023-07-12

## 2023-07-12 RX ORDER — LANCETS 30 GAUGE
EACH MISCELLANEOUS
Qty: 100 | Refills: 1 | Status: ACTIVE | COMMUNITY
Start: 2023-07-12 | End: 1900-01-01

## 2023-07-12 NOTE — HISTORY OF PRESENT ILLNESS
[FreeTextEntry1] : Follow up visit [de-identified] : Patient is 68 year female  with PMH of HTN, DM type 2, OA ,\par Patient presents for follow up for her chronic medical conditions. As per daughter she is non complaint with low Carb diet\par \par

## 2023-07-13 ENCOUNTER — NON-APPOINTMENT (OUTPATIENT)
Age: 68
End: 2023-07-13

## 2023-07-13 LAB
ALBUMIN SERPL ELPH-MCNC: 4.4 G/DL
ALP BLD-CCNC: 67 U/L
ALT SERPL-CCNC: 19 U/L
ANION GAP SERPL CALC-SCNC: 12 MMOL/L
APPEARANCE: CLEAR
AST SERPL-CCNC: 20 U/L
BILIRUB SERPL-MCNC: 0.2 MG/DL
BILIRUBIN URINE: NEGATIVE
BLOOD URINE: NEGATIVE
BUN SERPL-MCNC: 19 MG/DL
CALCIUM SERPL-MCNC: 9.6 MG/DL
CHLORIDE SERPL-SCNC: 108 MMOL/L
CHOLEST SERPL-MCNC: 181 MG/DL
CO2 SERPL-SCNC: 23 MMOL/L
COLOR: YELLOW
CREAT SERPL-MCNC: 0.57 MG/DL
EGFR: 98 ML/MIN/1.73M2
ESTIMATED AVERAGE GLUCOSE: 146 MG/DL
GLUCOSE QUALITATIVE U: NEGATIVE MG/DL
GLUCOSE SERPL-MCNC: 123 MG/DL
HBA1C MFR BLD HPLC: 6.7 %
HDLC SERPL-MCNC: 50 MG/DL
KETONES URINE: NEGATIVE MG/DL
LDLC SERPL CALC-MCNC: 105 MG/DL
LEUKOCYTE ESTERASE URINE: NEGATIVE
NITRITE URINE: NEGATIVE
NONHDLC SERPL-MCNC: 131 MG/DL
PH URINE: 5.5
POTASSIUM SERPL-SCNC: 4.3 MMOL/L
PROT SERPL-MCNC: 7 G/DL
PROTEIN URINE: 30 MG/DL
SODIUM SERPL-SCNC: 143 MMOL/L
SPECIFIC GRAVITY URINE: 1.03
TRIGL SERPL-MCNC: 151 MG/DL
UROBILINOGEN URINE: 0.2 MG/DL

## 2023-09-10 ENCOUNTER — RX RENEWAL (OUTPATIENT)
Age: 68
End: 2023-09-10

## 2023-09-13 ENCOUNTER — NON-APPOINTMENT (OUTPATIENT)
Age: 68
End: 2023-09-13

## 2023-09-20 ENCOUNTER — NON-APPOINTMENT (OUTPATIENT)
Age: 68
End: 2023-09-20

## 2023-11-15 ENCOUNTER — LABORATORY RESULT (OUTPATIENT)
Age: 68
End: 2023-11-15

## 2023-11-15 ENCOUNTER — APPOINTMENT (OUTPATIENT)
Dept: INTERNAL MEDICINE | Facility: CLINIC | Age: 68
End: 2023-11-15
Payer: MEDICARE

## 2023-11-15 VITALS
HEART RATE: 82 BPM | OXYGEN SATURATION: 95 % | BODY MASS INDEX: 28.97 KG/M2 | SYSTOLIC BLOOD PRESSURE: 140 MMHG | WEIGHT: 138 LBS | HEIGHT: 58 IN | DIASTOLIC BLOOD PRESSURE: 80 MMHG

## 2023-11-15 PROCEDURE — 99214 OFFICE O/P EST MOD 30 MIN: CPT | Mod: 25

## 2023-11-15 PROCEDURE — G0008: CPT

## 2023-11-15 PROCEDURE — 36415 COLL VENOUS BLD VENIPUNCTURE: CPT

## 2023-11-15 PROCEDURE — 90662 IIV NO PRSV INCREASED AG IM: CPT

## 2023-11-15 RX ORDER — ENALAPRIL MALEATE 10 MG/1
10 TABLET ORAL
Qty: 180 | Refills: 1 | Status: ACTIVE | COMMUNITY
Start: 2023-06-08 | End: 1900-01-01

## 2023-11-15 RX ORDER — BLOOD-GLUCOSE METER
70 EACH MISCELLANEOUS
Qty: 1 | Refills: 2 | Status: ACTIVE | COMMUNITY
Start: 2023-11-15 | End: 1900-01-01

## 2023-11-15 RX ORDER — LINAGLIPTIN 5 MG/1
5 TABLET, FILM COATED ORAL
Qty: 30 | Refills: 10 | Status: ACTIVE | COMMUNITY
Start: 2022-06-09 | End: 1900-01-01

## 2023-11-15 RX ORDER — MULTIVIT-MIN/FOLIC/VIT K/LYCOP 400-300MCG
500 TABLET ORAL
Qty: 30 | Refills: 10 | Status: ACTIVE | COMMUNITY
Start: 2023-09-10 | End: 1900-01-01

## 2023-11-19 LAB
ALBUMIN SERPL ELPH-MCNC: 4.6 G/DL
ALP BLD-CCNC: 73 U/L
ALT SERPL-CCNC: 29 U/L
ANION GAP SERPL CALC-SCNC: 15 MMOL/L
APPEARANCE: CLEAR
AST SERPL-CCNC: 25 U/L
BILIRUB SERPL-MCNC: 0.2 MG/DL
BILIRUBIN URINE: NEGATIVE
BLOOD URINE: NEGATIVE
BUN SERPL-MCNC: 21 MG/DL
CALCIUM SERPL-MCNC: 9.6 MG/DL
CHLORIDE SERPL-SCNC: 102 MMOL/L
CHOLEST SERPL-MCNC: 200 MG/DL
CO2 SERPL-SCNC: 25 MMOL/L
COLOR: YELLOW
CREAT SERPL-MCNC: 0.64 MG/DL
CREAT SPEC-SCNC: 131 MG/DL
EGFR: 95 ML/MIN/1.73M2
ESTIMATED AVERAGE GLUCOSE: 148 MG/DL
FOLATE SERPL-MCNC: >20 NG/ML
GLUCOSE QUALITATIVE U: NEGATIVE MG/DL
GLUCOSE SERPL-MCNC: 166 MG/DL
HBA1C MFR BLD HPLC: 6.8 %
HCT VFR BLD CALC: 46.4 %
HDLC SERPL-MCNC: 47 MG/DL
HGB BLD-MCNC: 14.5 G/DL
KETONES URINE: ABNORMAL MG/DL
LDLC SERPL CALC-MCNC: 96 MG/DL
LEUKOCYTE ESTERASE URINE: ABNORMAL
MCHC RBC-ENTMCNC: 30.1 PG
MCHC RBC-ENTMCNC: 31.3 GM/DL
MCV RBC AUTO: 96.5 FL
MICROALBUMIN 24H UR DL<=1MG/L-MCNC: 11.4 MG/DL
MICROALBUMIN/CREAT 24H UR-RTO: 87 MG/G
NITRITE URINE: NEGATIVE
NONHDLC SERPL-MCNC: 154 MG/DL
PH URINE: 5.5
PLATELET # BLD AUTO: 293 K/UL
POTASSIUM SERPL-SCNC: 4.3 MMOL/L
PROT SERPL-MCNC: 7.8 G/DL
PROTEIN URINE: 30 MG/DL
RBC # BLD: 4.81 M/UL
RBC # FLD: 14.3 %
SODIUM SERPL-SCNC: 143 MMOL/L
SPECIFIC GRAVITY URINE: 1.03
TRIGL SERPL-MCNC: 342 MG/DL
UROBILINOGEN URINE: 0.2 MG/DL
VIT B12 SERPL-MCNC: 462 PG/ML
WBC # FLD AUTO: 9.51 K/UL

## 2023-12-28 NOTE — ED PROVIDER NOTE - RESPIRATORY [+], MLM
Patient verbalized understanding and was given the information for Bennie Foster MD. She has no questions at this time.   SHORTNESS OF BREATH/COUGH

## 2024-03-27 ENCOUNTER — APPOINTMENT (OUTPATIENT)
Dept: INTERNAL MEDICINE | Facility: CLINIC | Age: 69
End: 2024-03-27
Payer: MEDICARE

## 2024-03-27 ENCOUNTER — NON-APPOINTMENT (OUTPATIENT)
Age: 69
End: 2024-03-27

## 2024-03-27 ENCOUNTER — LABORATORY RESULT (OUTPATIENT)
Age: 69
End: 2024-03-27

## 2024-03-27 VITALS
WEIGHT: 136 LBS | OXYGEN SATURATION: 97 % | DIASTOLIC BLOOD PRESSURE: 79 MMHG | HEART RATE: 75 BPM | SYSTOLIC BLOOD PRESSURE: 121 MMHG | HEIGHT: 58 IN | BODY MASS INDEX: 28.55 KG/M2

## 2024-03-27 DIAGNOSIS — N39.0 URINARY TRACT INFECTION, SITE NOT SPECIFIED: ICD-10-CM

## 2024-03-27 DIAGNOSIS — Z23 ENCOUNTER FOR IMMUNIZATION: ICD-10-CM

## 2024-03-27 DIAGNOSIS — R26.81 UNSTEADINESS ON FEET: ICD-10-CM

## 2024-03-27 DIAGNOSIS — Z13.820 ENCOUNTER FOR SCREENING FOR OSTEOPOROSIS: ICD-10-CM

## 2024-03-27 DIAGNOSIS — I10 ESSENTIAL (PRIMARY) HYPERTENSION: ICD-10-CM

## 2024-03-27 DIAGNOSIS — Z00.00 ENCOUNTER FOR GENERAL ADULT MEDICAL EXAMINATION W/OUT ABNORMAL FINDINGS: ICD-10-CM

## 2024-03-27 DIAGNOSIS — E11.9 TYPE 2 DIABETES MELLITUS W/OUT COMPLICATIONS: ICD-10-CM

## 2024-03-27 DIAGNOSIS — Z12.11 ENCOUNTER FOR SCREENING FOR MALIGNANT NEOPLASM OF COLON: ICD-10-CM

## 2024-03-27 DIAGNOSIS — M17.10 UNILATERAL PRIMARY OSTEOARTHRITIS, UNSPECIFIED KNEE: ICD-10-CM

## 2024-03-27 DIAGNOSIS — Z12.39 ENCOUNTER FOR OTHER SCREENING FOR MALIGNANT NEOPLASM OF BREAST: ICD-10-CM

## 2024-03-27 PROCEDURE — 93000 ELECTROCARDIOGRAM COMPLETE: CPT | Mod: 59

## 2024-03-27 PROCEDURE — 36415 COLL VENOUS BLD VENIPUNCTURE: CPT

## 2024-03-27 PROCEDURE — G0439: CPT

## 2024-03-27 RX ORDER — BLOOD SUGAR DIAGNOSTIC
STRIP MISCELLANEOUS 3 TIMES DAILY
Qty: 100 | Refills: 5 | Status: ACTIVE | COMMUNITY
Start: 2023-07-12 | End: 1900-01-01

## 2024-03-27 RX ORDER — BLOOD-GLUCOSE METER
W/DEVICE KIT MISCELLANEOUS
Qty: 1 | Refills: 0 | Status: ACTIVE | COMMUNITY
Start: 2023-07-12 | End: 1900-01-01

## 2024-03-27 NOTE — HISTORY OF PRESENT ILLNESS
[Family Member] : family member [FreeTextEntry1] :  Annual physical exam [de-identified] : Patient is 68 year female  with PMH of HTN, DM type 2, OA , came today for annual physical exam C/o acute on chronic low back pain and b/l knees pain Unable to walk w/o walker assistance, getting very tired  due to knee pain

## 2024-03-27 NOTE — HEALTH RISK ASSESSMENT
[Good] : ~his/her~ current health as good [Yes] : Yes [Monthly or less (1 pt)] : Monthly or less (1 point) [1 or 2 (0 pts)] : 1 or 2 (0 points) [Never (0 pts)] : Never (0 points) [No] : In the past 12 months have you used drugs other than those required for medical reasons? No [No falls in past year] : Patient reported no falls in the past year [Little interest or pleasure doing things] : 1) Little interest or pleasure doing things [Feeling down, depressed, or hopeless] : 2) Feeling down, depressed, or hopeless [0] : 2) Feeling down, depressed, or hopeless: Not at all (0) [PHQ-2 Negative - No further assessment needed] : PHQ-2 Negative - No further assessment needed [No Retinopathy] : No retinopathy [Patient reported mammogram was normal] : Patient reported mammogram was normal [Patient reported PAP Smear was normal] : Patient reported PAP Smear was normal [Patient reported colonoscopy was normal] : Patient reported colonoscopy was normal [HIV test declined] : HIV test declined [Hepatitis C test declined] : Hepatitis C test declined [None] : None [With Family] : lives with family [Unemployed] : unemployed [Less Than High School] : less than high school [] :  [# Of Children ___] : has [unfilled] children [Feels Safe at Home] : Feels safe at home [Fully functional (bathing, dressing, toileting, transferring, walking, feeding)] : Fully functional (bathing, dressing, toileting, transferring, walking, feeding) [Fully functional (using the telephone, shopping, preparing meals, housekeeping, doing laundry, using] : Fully functional and needs no help or supervision to perform IADLs (using the telephone, shopping, preparing meals, housekeeping, doing laundry, using transportation, managing medications and managing finances) [Smoke Detector] : smoke detector [Carbon Monoxide Detector] : carbon monoxide detector [Seat Belt] :  uses seat belt [Sunscreen] : uses sunscreen [Reviewed no changes] : Reviewed, no changes [Aggressive treatment] : aggressive treatment [Time Spent: ___ minutes] : Time Spent: [unfilled] minutes [Former] : Former [> 15 Years] : > 15 Years [Audit-CScore] : 1 [de-identified] : no [[id number]] : 0 [EyeExamDate] : 10/2023 [Change in mental status noted] : No change in mental status noted [Language] : denies difficulty with language [Reports changes in hearing] : Reports no changes in hearing [Reports changes in vision] : Reports no changes in vision [MammogramDate] : 09/13/2023 [PapSmearDate] : 09/2023 [ColonoscopyDate] : 01/01/2018 [AdvancecareDate] : 03/27/2024

## 2024-03-28 DIAGNOSIS — E78.1 PURE HYPERGLYCERIDEMIA: ICD-10-CM

## 2024-03-28 LAB
25(OH)D3 SERPL-MCNC: 39 NG/ML
ALBUMIN SERPL ELPH-MCNC: 4.9 G/DL
ALP BLD-CCNC: 75 U/L
ALT SERPL-CCNC: 28 U/L
ANION GAP SERPL CALC-SCNC: 16 MMOL/L
APPEARANCE: CLEAR
AST SERPL-CCNC: 24 U/L
BILIRUB SERPL-MCNC: 0.2 MG/DL
BILIRUBIN URINE: NEGATIVE
BLOOD URINE: NEGATIVE
BUN SERPL-MCNC: 19 MG/DL
CALCIUM SERPL-MCNC: 9.5 MG/DL
CHLORIDE SERPL-SCNC: 102 MMOL/L
CHOLEST SERPL-MCNC: 187 MG/DL
CO2 SERPL-SCNC: 24 MMOL/L
COLOR: YELLOW
CREAT SERPL-MCNC: 0.62 MG/DL
CREAT SPEC-SCNC: 172 MG/DL
EGFR: 96 ML/MIN/1.73M2
ESTIMATED AVERAGE GLUCOSE: 148 MG/DL
FOLATE SERPL-MCNC: 15 NG/ML
GLUCOSE QUALITATIVE U: NEGATIVE MG/DL
GLUCOSE SERPL-MCNC: 130 MG/DL
HBA1C MFR BLD HPLC: 6.8 %
HCT VFR BLD CALC: 48 %
HDLC SERPL-MCNC: 45 MG/DL
HGB BLD-MCNC: 14.7 G/DL
KETONES URINE: ABNORMAL MG/DL
LDLC SERPL CALC-MCNC: 92 MG/DL
LEUKOCYTE ESTERASE URINE: NEGATIVE
MCHC RBC-ENTMCNC: 29.8 PG
MCHC RBC-ENTMCNC: 30.6 GM/DL
MCV RBC AUTO: 97.4 FL
MICROALBUMIN 24H UR DL<=1MG/L-MCNC: 15.4 MG/DL
MICROALBUMIN/CREAT 24H UR-RTO: 89 MG/G
NITRITE URINE: NEGATIVE
NONHDLC SERPL-MCNC: 142 MG/DL
PH URINE: 5.5
PLATELET # BLD AUTO: 313 K/UL
POTASSIUM SERPL-SCNC: 4.3 MMOL/L
PROT SERPL-MCNC: 8 G/DL
PROTEIN URINE: 30 MG/DL
RBC # BLD: 4.93 M/UL
RBC # FLD: 14.1 %
SODIUM SERPL-SCNC: 142 MMOL/L
SPECIFIC GRAVITY URINE: >1.03
TRIGL SERPL-MCNC: 302 MG/DL
TSH SERPL-ACNC: 1.02 UIU/ML
UROBILINOGEN URINE: 0.2 MG/DL
VIT B12 SERPL-MCNC: 453 PG/ML
WBC # FLD AUTO: 8.14 K/UL

## 2024-03-28 RX ORDER — FENOFIBRATE 134 MG/1
134 CAPSULE ORAL
Qty: 90 | Refills: 1 | Status: ACTIVE | COMMUNITY
Start: 2024-03-28 | End: 1900-01-01

## 2024-04-21 ENCOUNTER — RX RENEWAL (OUTPATIENT)
Age: 69
End: 2024-04-21

## 2024-04-21 RX ORDER — METFORMIN HYDROCHLORIDE 1000 MG/1
1000 TABLET, COATED ORAL
Qty: 180 | Refills: 1 | Status: ACTIVE | COMMUNITY
Start: 2023-03-23 | End: 1900-01-01

## 2024-07-24 ENCOUNTER — APPOINTMENT (OUTPATIENT)
Dept: INTERNAL MEDICINE | Facility: CLINIC | Age: 69
End: 2024-07-24
Payer: MEDICARE

## 2024-07-24 VITALS
WEIGHT: 136 LBS | HEIGHT: 58 IN | DIASTOLIC BLOOD PRESSURE: 84 MMHG | OXYGEN SATURATION: 96 % | BODY MASS INDEX: 28.55 KG/M2 | SYSTOLIC BLOOD PRESSURE: 140 MMHG | HEART RATE: 81 BPM

## 2024-07-24 DIAGNOSIS — R26.81 UNSTEADINESS ON FEET: ICD-10-CM

## 2024-07-24 DIAGNOSIS — E11.9 TYPE 2 DIABETES MELLITUS W/OUT COMPLICATIONS: ICD-10-CM

## 2024-07-24 DIAGNOSIS — N39.0 URINARY TRACT INFECTION, SITE NOT SPECIFIED: ICD-10-CM

## 2024-07-24 DIAGNOSIS — E78.1 PURE HYPERGLYCERIDEMIA: ICD-10-CM

## 2024-07-24 DIAGNOSIS — I10 ESSENTIAL (PRIMARY) HYPERTENSION: ICD-10-CM

## 2024-07-24 PROCEDURE — 99214 OFFICE O/P EST MOD 30 MIN: CPT

## 2024-07-24 PROCEDURE — 36415 COLL VENOUS BLD VENIPUNCTURE: CPT

## 2024-07-24 PROCEDURE — G2211 COMPLEX E/M VISIT ADD ON: CPT

## 2024-07-24 NOTE — COUNSELING
[Fall prevention counseling provided] : Fall prevention counseling provided [No throw rugs] : No throw rugs [Behavioral health counseling provided] : Behavioral health counseling provided [Engage in a relaxing activity] : Engage in a relaxing activity

## 2024-07-24 NOTE — REVIEW OF SYSTEMS
[TextEntry] : Head: Denies headache, dizziness Eyes: No acute vision problem.  Ears: Denies hearing loss, tinnitus, no ear pain Nose: Denies nasal obstruction or any discharges Neck: Denies stiffness or muscle tenderness Chest: Denies cough, wheezes, SOB CV: Denies chest pain, palpitation.  Abdominal: Denies abdominal pain, change in bowel movement.  Neurology: Denies changes in mental status, seizure, no neurological deficit Musculoskeletal: endorses joint pain associated with osteoarthritis.

## 2024-07-24 NOTE — HEALTH RISK ASSESSMENT
[Yes] : Yes [Monthly or less (1 pt)] : Monthly or less (1 point) [1 or 2 (0 pts)] : 1 or 2 (0 points) [Never (0 pts)] : Never (0 points) [No] : In the past 12 months have you used drugs other than those required for medical reasons? No [No falls in past year] : Patient reported no falls in the past year [Little interest or pleasure doing things] : 1) Little interest or pleasure doing things [Feeling down, depressed, or hopeless] : 2) Feeling down, depressed, or hopeless [0] : 2) Feeling down, depressed, or hopeless: Not at all (0) [PHQ-2 Negative - No further assessment needed] : PHQ-2 Negative - No further assessment needed [Name: ___] : Health Care Proxy's Name: [unfilled]  [Relationship: ___] : Relationship: [unfilled] [Aggressive treatment] : aggressive treatment [I will adhere to the patient's wishes.] : I will adhere to the patient's wishes. [Time Spent: ___ minutes] : Time Spent: [unfilled] minutes [Former] : Former [> 15 Years] : > 15 Years [de-identified] : Gynecology appointment next month. Opthalmologist  [Audit-CScore] : 1 [[facility]] : 0 [[id number]] : 0 [AdvancecareDate] : 11/15/2023

## 2024-07-24 NOTE — HISTORY OF PRESENT ILLNESS
[FreeTextEntry1] : Patient is here today for a follow up visit. [de-identified] : Patient is 69-year-old female with PMH of HTN, DM type 2, OA. Patient presents for follow up for her chronic medical conditions. Patient states her blood sugar has been elevated for the last 2-3 weeks. As per daughter she is noncompliant with low Carb diet, patient drinks juice, soda, and eats cookies. Patient states she is feeling well today with no current complaints.  Currently on Metformin 1000 mg 1 tab BID Tradjenta 5 mg QD Fenofibrate 134 mg QD Enalapril 10 mg BID Patient is non compliant with Glucose monitor- recommend to use Free style [place] for better glucose monitor

## 2024-07-25 LAB
ALBUMIN SERPL ELPH-MCNC: 4.4 G/DL
ALP BLD-CCNC: 57 U/L
ALT SERPL-CCNC: 31 U/L
ANION GAP SERPL CALC-SCNC: 15 MMOL/L
AST SERPL-CCNC: 31 U/L
BILIRUB SERPL-MCNC: 0.2 MG/DL
BUN SERPL-MCNC: 13 MG/DL
CALCIUM SERPL-MCNC: 9 MG/DL
CHLORIDE SERPL-SCNC: 104 MMOL/L
CHOLEST SERPL-MCNC: 172 MG/DL
CO2 SERPL-SCNC: 20 MMOL/L
CREAT SERPL-MCNC: 0.51 MG/DL
EGFR: 100 ML/MIN/1.73M2
ESTIMATED AVERAGE GLUCOSE: 151 MG/DL
GLUCOSE SERPL-MCNC: 148 MG/DL
HBA1C MFR BLD HPLC: 6.9 %
HCT VFR BLD CALC: 40.8 %
HDLC SERPL-MCNC: 42 MG/DL
HGB BLD-MCNC: 13.3 G/DL
LDLC SERPL CALC-MCNC: 93 MG/DL
MCHC RBC-ENTMCNC: 29.8 PG
MCHC RBC-ENTMCNC: 32.6 GM/DL
MCV RBC AUTO: 91.5 FL
NONHDLC SERPL-MCNC: 130 MG/DL
PLATELET # BLD AUTO: 300 K/UL
POTASSIUM SERPL-SCNC: 4 MMOL/L
PROT SERPL-MCNC: 7.1 G/DL
RBC # BLD: 4.46 M/UL
RBC # FLD: 13.8 %
SODIUM SERPL-SCNC: 140 MMOL/L
TRIGL SERPL-MCNC: 216 MG/DL
WBC # FLD AUTO: 7.34 K/UL

## 2024-10-23 ENCOUNTER — APPOINTMENT (OUTPATIENT)
Dept: INTERNAL MEDICINE | Facility: CLINIC | Age: 69
End: 2024-10-23
Payer: MEDICARE

## 2024-10-23 ENCOUNTER — LABORATORY RESULT (OUTPATIENT)
Age: 69
End: 2024-10-23

## 2024-10-23 VITALS
OXYGEN SATURATION: 96 % | HEIGHT: 58 IN | HEART RATE: 87 BPM | WEIGHT: 132 LBS | SYSTOLIC BLOOD PRESSURE: 154 MMHG | BODY MASS INDEX: 27.71 KG/M2 | DIASTOLIC BLOOD PRESSURE: 80 MMHG

## 2024-10-23 DIAGNOSIS — E11.9 TYPE 2 DIABETES MELLITUS W/OUT COMPLICATIONS: ICD-10-CM

## 2024-10-23 DIAGNOSIS — R30.0 DYSURIA: ICD-10-CM

## 2024-10-23 DIAGNOSIS — N39.0 URINARY TRACT INFECTION, SITE NOT SPECIFIED: ICD-10-CM

## 2024-10-23 DIAGNOSIS — Z23 ENCOUNTER FOR IMMUNIZATION: ICD-10-CM

## 2024-10-23 DIAGNOSIS — R26.81 UNSTEADINESS ON FEET: ICD-10-CM

## 2024-10-23 DIAGNOSIS — I10 ESSENTIAL (PRIMARY) HYPERTENSION: ICD-10-CM

## 2024-10-23 DIAGNOSIS — E78.1 PURE HYPERGLYCERIDEMIA: ICD-10-CM

## 2024-10-23 PROCEDURE — G2211 COMPLEX E/M VISIT ADD ON: CPT

## 2024-10-23 PROCEDURE — 36415 COLL VENOUS BLD VENIPUNCTURE: CPT

## 2024-10-23 PROCEDURE — G0008: CPT

## 2024-10-23 PROCEDURE — 99214 OFFICE O/P EST MOD 30 MIN: CPT

## 2024-10-23 PROCEDURE — 90662 IIV NO PRSV INCREASED AG IM: CPT

## 2024-10-27 LAB
ALBUMIN SERPL ELPH-MCNC: 4.5 G/DL
ALP BLD-CCNC: 73 U/L
ALT SERPL-CCNC: 26 U/L
ANION GAP SERPL CALC-SCNC: 16 MMOL/L
APPEARANCE: CLEAR
AST SERPL-CCNC: 26 U/L
BILIRUB SERPL-MCNC: 0.3 MG/DL
BILIRUBIN URINE: NEGATIVE
BLOOD URINE: NEGATIVE
BUN SERPL-MCNC: 15 MG/DL
CALCIUM SERPL-MCNC: 9.2 MG/DL
CHLORIDE SERPL-SCNC: 102 MMOL/L
CHOLEST SERPL-MCNC: 202 MG/DL
CO2 SERPL-SCNC: 25 MMOL/L
COLOR: YELLOW
CREAT SERPL-MCNC: 0.57 MG/DL
CREAT SPEC-SCNC: 134 MG/DL
EGFR: 97 ML/MIN/1.73M2
ESTIMATED AVERAGE GLUCOSE: 137 MG/DL
FOLATE SERPL-MCNC: 17.5 NG/ML
GLUCOSE QUALITATIVE U: NEGATIVE MG/DL
GLUCOSE SERPL-MCNC: 121 MG/DL
HBA1C MFR BLD HPLC: 6.4 %
HCT VFR BLD CALC: 44.8 %
HDLC SERPL-MCNC: 43 MG/DL
HGB BLD-MCNC: 14 G/DL
KETONES URINE: NEGATIVE MG/DL
LDLC SERPL CALC-MCNC: 107 MG/DL
LEUKOCYTE ESTERASE URINE: NEGATIVE
MCHC RBC-ENTMCNC: 29.3 PG
MCHC RBC-ENTMCNC: 31.3 GM/DL
MCV RBC AUTO: 93.7 FL
MICROALBUMIN 24H UR DL<=1MG/L-MCNC: 6.7 MG/DL
MICROALBUMIN/CREAT 24H UR-RTO: 50 MG/G
NITRITE URINE: NEGATIVE
NONHDLC SERPL-MCNC: 159 MG/DL
PH URINE: 5.5
PLATELET # BLD AUTO: 309 K/UL
POTASSIUM SERPL-SCNC: 4.2 MMOL/L
PROT SERPL-MCNC: 7.4 G/DL
PROTEIN URINE: 30 MG/DL
RBC # BLD: 4.78 M/UL
RBC # FLD: 14.2 %
SODIUM SERPL-SCNC: 143 MMOL/L
SPECIFIC GRAVITY URINE: 1.03
TRIGL SERPL-MCNC: 307 MG/DL
UROBILINOGEN URINE: 0.2 MG/DL
VIT B12 SERPL-MCNC: >2000 PG/ML
WBC # FLD AUTO: 9.05 K/UL

## 2025-07-31 NOTE — ED ADULT NURSE NOTE - EXTENSIONS OF SELF_ADULT
What Is The Reason For Today's Visit?: Full Body Skin Examination
What Is The Reason For Today's Visit? (Being Monitored For X): concerning skin lesions on a periodic basis
None/Dentures